# Patient Record
Sex: FEMALE | Race: BLACK OR AFRICAN AMERICAN | Employment: FULL TIME | URBAN - METROPOLITAN AREA
[De-identification: names, ages, dates, MRNs, and addresses within clinical notes are randomized per-mention and may not be internally consistent; named-entity substitution may affect disease eponyms.]

---

## 2018-06-10 PROBLEM — K81.0 ACUTE CHOLECYSTITIS: Status: ACTIVE | Noted: 2018-06-10

## 2018-06-11 PROBLEM — K80.00 ACUTE CALCULOUS CHOLECYSTITIS: Status: ACTIVE | Noted: 2018-06-10

## 2018-06-13 ENCOUNTER — TELEPHONE (OUTPATIENT)
Dept: SURGERY | Age: 33
End: 2018-06-13

## 2018-06-18 ENCOUNTER — TELEPHONE (OUTPATIENT)
Dept: SURGERY | Age: 33
End: 2018-06-18

## 2018-06-19 PROBLEM — S30.1XXA ABDOMINAL HEMATOMA: Status: ACTIVE | Noted: 2018-06-19

## 2018-06-19 PROBLEM — Z90.49 S/P LAPAROSCOPIC CHOLECYSTECTOMY: Status: ACTIVE | Noted: 2018-06-19

## 2018-06-19 PROBLEM — S36.112A: Status: ACTIVE | Noted: 2018-06-19

## 2018-06-22 ENCOUNTER — TELEPHONE (OUTPATIENT)
Dept: SURGERY | Age: 33
End: 2018-06-22

## 2018-06-22 DIAGNOSIS — T14.8XXA HEMATOMA: Primary | ICD-10-CM

## 2018-06-22 RX ORDER — OXYCODONE HYDROCHLORIDE AND ACETAMINOPHEN 5; 325 MG/1; MG/1
1 TABLET ORAL EVERY 6 HOURS PRN
Qty: 12 TABLET | Refills: 0 | Status: SHIPPED | OUTPATIENT
Start: 2018-06-22 | End: 2018-06-25

## 2018-06-28 ENCOUNTER — OFFICE VISIT (OUTPATIENT)
Dept: SURGERY | Age: 33
End: 2018-06-28

## 2018-06-28 VITALS
BODY MASS INDEX: 41.23 KG/M2 | HEART RATE: 97 BPM | SYSTOLIC BLOOD PRESSURE: 136 MMHG | DIASTOLIC BLOOD PRESSURE: 81 MMHG | HEIGHT: 70 IN | WEIGHT: 288 LBS

## 2018-06-28 DIAGNOSIS — Z90.49 S/P LAPAROSCOPIC CHOLECYSTECTOMY: Primary | ICD-10-CM

## 2018-06-28 PROCEDURE — 99024 POSTOP FOLLOW-UP VISIT: CPT | Performed by: SURGERY

## 2018-11-12 ENCOUNTER — APPOINTMENT (OUTPATIENT)
Dept: GENERAL RADIOLOGY | Age: 33
End: 2018-11-12
Payer: COMMERCIAL

## 2018-11-12 ENCOUNTER — HOSPITAL ENCOUNTER (EMERGENCY)
Age: 33
Discharge: HOME | End: 2018-11-12
Payer: COMMERCIAL

## 2018-11-12 VITALS
TEMPERATURE: 98.2 F | OXYGEN SATURATION: 97 % | RESPIRATION RATE: 16 BRPM | SYSTOLIC BLOOD PRESSURE: 142 MMHG | DIASTOLIC BLOOD PRESSURE: 97 MMHG | HEART RATE: 87 BPM

## 2018-11-12 DIAGNOSIS — S93.401A SPRAIN OF RIGHT ANKLE, UNSPECIFIED LIGAMENT, INITIAL ENCOUNTER: Primary | ICD-10-CM

## 2018-11-12 PROCEDURE — 99283 EMERGENCY DEPT VISIT LOW MDM: CPT

## 2018-11-12 PROCEDURE — 73630 X-RAY EXAM OF FOOT: CPT

## 2018-11-12 PROCEDURE — 73610 X-RAY EXAM OF ANKLE: CPT

## 2018-11-12 PROCEDURE — 6370000000 HC RX 637 (ALT 250 FOR IP): Performed by: PHYSICIAN ASSISTANT

## 2018-11-12 RX ORDER — HYDROCODONE BITARTRATE AND ACETAMINOPHEN 5; 325 MG/1; MG/1
1 TABLET ORAL ONCE
Status: COMPLETED | OUTPATIENT
Start: 2018-11-12 | End: 2018-11-12

## 2018-11-12 RX ORDER — HYDROCODONE BITARTRATE AND ACETAMINOPHEN 5; 325 MG/1; MG/1
1 TABLET ORAL EVERY 6 HOURS PRN
Qty: 10 TABLET | Refills: 0 | Status: SHIPPED | OUTPATIENT
Start: 2018-11-12 | End: 2018-11-15

## 2018-11-12 RX ADMIN — HYDROCODONE BITARTRATE AND ACETAMINOPHEN 1 TABLET: 5; 325 TABLET ORAL at 21:16

## 2018-11-12 ASSESSMENT — PAIN DESCRIPTION - ORIENTATION: ORIENTATION: RIGHT

## 2018-11-12 ASSESSMENT — PAIN DESCRIPTION - DESCRIPTORS: DESCRIPTORS: ACHING;SORE;THROBBING

## 2018-11-12 ASSESSMENT — PAIN SCALES - GENERAL
PAINLEVEL_OUTOF10: 7
PAINLEVEL_OUTOF10: 10
PAINLEVEL_OUTOF10: 7

## 2018-11-12 ASSESSMENT — PAIN DESCRIPTION - PAIN TYPE: TYPE: ACUTE PAIN

## 2018-11-12 ASSESSMENT — ENCOUNTER SYMPTOMS
BACK PAIN: 0
NAUSEA: 0

## 2018-11-12 ASSESSMENT — PAIN DESCRIPTION - LOCATION: LOCATION: ANKLE

## 2018-11-12 ASSESSMENT — PAIN - FUNCTIONAL ASSESSMENT: PAIN_FUNCTIONAL_ASSESSMENT: 0-10

## 2018-11-13 NOTE — ED PROVIDER NOTES
SECTION      x 3    CHOLECYSTECTOMY, LAPAROSCOPIC  2018     Laparoscopic cholecystectomy with cholangiogram    WISDOM TOOTH EXTRACTION         CURRENT MEDICATIONS       Previous Medications    ALBUTEROL SULFATE  (90 BASE) MCG/ACT INHALER    Inhale 2 puffs into the lungs every 6 hours as needed for Wheezing    DIPHENHYDRAMINE (BENADRYL) 25 MG CAPSULE    Take 1-2 capsules by mouth every 6 hours as needed for Itching    IBUPROFEN (ADVIL;MOTRIN) 800 MG TABLET    Take 1 tablet by mouth every 8 hours as needed for Pain    METOCLOPRAMIDE (REGLAN) 10 MG TABLET    Take 1 tablet by mouth 4 times daily WARNING:  May cause drowsiness. May impair ability to operate vehicles or machinery. Do not use in combination with alcohol. OMEPRAZOLE (PRILOSEC) 20 MG DELAYED RELEASE CAPSULE    Take 40 mg by mouth daily       ALLERGIES     Shellfish-derived products    FAMILY HISTORY       Family History   Problem Relation Age of Onset    High Blood Pressure Father     Diabetes Paternal Grandmother     High Blood Pressure Paternal Grandmother     Diabetes Paternal Grandfather     High Blood Pressure Paternal Grandfather     Asthma Mother     Cancer Maternal Grandmother      Family Status   Relation Status    Father Alive    PGM Alive    [facility] Alive    Mother Alive    [place]         SOCIAL HISTORY    reports that she has never smoked. She has never used smokeless tobacco. She reports that she does not drink alcohol or use drugs. PHYSICAL EXAM    (up to 7 for level 4, 8 or more for level 5)     ED Triage Vitals [18]   BP Temp Temp Source Pulse Resp SpO2 Height Weight   (!) 142/97 98.2 °F (36.8 °C) Oral 87 16 97 % -- --       Physical Exam   Constitutional: She is oriented to person, place, and time. She appears well-developed and well-nourished. No distress. HENT:   Head: Normocephalic and atraumatic. Neck: Neck supple. Cardiovascular: Intact distal pulses.     Pulmonary/Chest: Effort

## 2018-12-13 ENCOUNTER — HOSPITAL ENCOUNTER (EMERGENCY)
Age: 33
Discharge: HOME | End: 2018-12-13
Attending: EMERGENCY MEDICINE
Payer: COMMERCIAL

## 2018-12-13 VITALS
BODY MASS INDEX: 46.43 KG/M2 | RESPIRATION RATE: 16 BRPM | WEIGHT: 293 LBS | DIASTOLIC BLOOD PRESSURE: 86 MMHG | SYSTOLIC BLOOD PRESSURE: 148 MMHG | OXYGEN SATURATION: 100 % | HEART RATE: 76 BPM | TEMPERATURE: 98.7 F

## 2018-12-13 DIAGNOSIS — T78.40XA ALLERGIC REACTION, INITIAL ENCOUNTER: Primary | ICD-10-CM

## 2018-12-13 PROCEDURE — 99283 EMERGENCY DEPT VISIT LOW MDM: CPT

## 2018-12-13 PROCEDURE — 6370000000 HC RX 637 (ALT 250 FOR IP): Performed by: EMERGENCY MEDICINE

## 2018-12-13 RX ORDER — DIPHENHYDRAMINE HCL 25 MG
25-50 CAPSULE ORAL EVERY 6 HOURS PRN
Qty: 20 CAPSULE | Refills: 0 | Status: ON HOLD | OUTPATIENT
Start: 2018-12-13 | End: 2019-12-29

## 2018-12-13 RX ORDER — PREDNISONE 20 MG/1
60 TABLET ORAL ONCE
Status: COMPLETED | OUTPATIENT
Start: 2018-12-13 | End: 2018-12-13

## 2018-12-13 RX ORDER — PREDNISONE 20 MG/1
40 TABLET ORAL DAILY
Qty: 10 TABLET | Refills: 0 | Status: SHIPPED | OUTPATIENT
Start: 2018-12-13 | End: 2018-12-18

## 2018-12-13 RX ADMIN — PREDNISONE 60 MG: 20 TABLET ORAL at 00:42

## 2018-12-13 NOTE — ED PROVIDER NOTES
reviewed radiologic plain film image(s). ALL OTHER NON-PLAIN FILM IMAGES SUCH AS CT, ULTRASOUND AND MRI HAVE BEEN READ BY THE RADIOLOGIST. No orders to display              PROCEDURES    ED COURSE/MDM  Patient seen and evaluated. Patient was given Oral prednisone while in the ED. Patient requested not to have Benadryl she would like to drive home this evening. States she has Benadryl at home that she'll take. I discussed results and plan of care with patient and family. 1:42 AM  Patient reassessed. Patient has no respiratory distress. No wheezing on exam.  Patient requesting discharge. We'll prescribe Benadryl and steroid burst at discharge. Return instructions provided     I do feel patient can be safely discharged to home. Recommend follow up with PCP in 2-3 days for re-evaluation. Reasons to RT ED discussed. Patient expresses understanding and is in agreement with plan. Patient was given scripts for the following medications. I counseled patient how to take these medications. New Prescriptions    No medications on file           CLINICAL IMPRESSION  1. Allergic reaction, initial encounter        Blood pressure (!) 151/83, pulse 81, temperature 99 °F (37.2 °C), temperature source Oral, resp. rate 18, weight (!) 314 lb 6 oz (142.6 kg), last menstrual period 11/12/2018, SpO2 100 %, unknown if currently breastfeeding. DISPOSITION  Patient was discharged to home in good condition. Disclaimer: All medical record entries made by [address] [place] [address] [address] [facility].       (Please note that this note was completed with a voice recognition program. Every attempt was made to edit the dictations, but inevitably there remain words that are mis-transcribed.)           Melissa Garcia MD  12/13/18 0144

## 2019-04-03 VITALS
HEIGHT: 70 IN | OXYGEN SATURATION: 100 % | BODY MASS INDEX: 41.95 KG/M2 | HEART RATE: 78 BPM | SYSTOLIC BLOOD PRESSURE: 159 MMHG | RESPIRATION RATE: 18 BRPM | DIASTOLIC BLOOD PRESSURE: 99 MMHG | TEMPERATURE: 98.4 F | WEIGHT: 293 LBS

## 2019-04-03 PROCEDURE — 93005 ELECTROCARDIOGRAM TRACING: CPT | Performed by: EMERGENCY MEDICINE

## 2019-04-03 ASSESSMENT — PAIN DESCRIPTION - ORIENTATION: ORIENTATION: RIGHT

## 2019-04-03 ASSESSMENT — PAIN DESCRIPTION - DESCRIPTORS
DESCRIPTORS: STABBING
DESCRIPTORS_2: SHARP

## 2019-04-03 ASSESSMENT — PAIN DESCRIPTION - PROGRESSION
CLINICAL_PROGRESSION_2: NOT CHANGED
CLINICAL_PROGRESSION: GRADUALLY WORSENING

## 2019-04-03 ASSESSMENT — PAIN DESCRIPTION - FREQUENCY: FREQUENCY: CONTINUOUS

## 2019-04-03 ASSESSMENT — PAIN SCALES - GENERAL: PAINLEVEL_OUTOF10: 8

## 2019-04-03 ASSESSMENT — PAIN DESCRIPTION - PAIN TYPE
TYPE: ACUTE PAIN
TYPE_2: ACUTE PAIN

## 2019-04-03 ASSESSMENT — PAIN DESCRIPTION - INTENSITY: RATING_2: 4

## 2019-04-03 ASSESSMENT — PAIN DESCRIPTION - LOCATION
LOCATION: ABDOMEN
LOCATION_2: CHEST

## 2019-04-03 ASSESSMENT — PAIN DESCRIPTION - ONSET
ONSET_2: ON-GOING
ONSET: ON-GOING

## 2019-04-03 ASSESSMENT — PAIN DESCRIPTION - DURATION: DURATION_2: INTERMITTENT

## 2019-04-04 ENCOUNTER — APPOINTMENT (OUTPATIENT)
Dept: CT IMAGING | Age: 34
End: 2019-04-04
Payer: COMMERCIAL

## 2019-04-04 ENCOUNTER — HOSPITAL ENCOUNTER (EMERGENCY)
Age: 34
Discharge: HOME | End: 2019-04-04
Attending: EMERGENCY MEDICINE
Payer: COMMERCIAL

## 2019-04-04 DIAGNOSIS — R10.31 ABDOMINAL PAIN, RIGHT LOWER QUADRANT: Primary | ICD-10-CM

## 2019-04-04 LAB
A/G RATIO: 1.4 (ref 1.1–2.2)
ALBUMIN SERPL-MCNC: 4.2 G/DL (ref 3.4–5)
ALP BLD-CCNC: 99 U/L (ref 40–129)
ALT SERPL-CCNC: 15 U/L (ref 10–40)
ANION GAP SERPL CALCULATED.3IONS-SCNC: 12 MMOL/L (ref 3–16)
AST SERPL-CCNC: 12 U/L (ref 15–37)
BACTERIA: ABNORMAL /HPF
BASOPHILS ABSOLUTE: 0 K/UL (ref 0–0.2)
BASOPHILS RELATIVE PERCENT: 0.8 %
BILIRUB SERPL-MCNC: <0.2 MG/DL (ref 0–1)
BILIRUBIN URINE: NEGATIVE
BLOOD, URINE: NEGATIVE
BUN BLDV-MCNC: 6 MG/DL (ref 7–20)
CALCIUM SERPL-MCNC: 9.1 MG/DL (ref 8.3–10.6)
CHLORIDE BLD-SCNC: 102 MMOL/L (ref 99–110)
CLARITY: ABNORMAL
CO2: 22 MMOL/L (ref 21–32)
COLOR: YELLOW
CREAT SERPL-MCNC: 0.7 MG/DL (ref 0.6–1.1)
EKG ATRIAL RATE: 70 BPM
EKG DIAGNOSIS: NORMAL
EKG P AXIS: 46 DEGREES
EKG P-R INTERVAL: 174 MS
EKG Q-T INTERVAL: 386 MS
EKG QRS DURATION: 90 MS
EKG QTC CALCULATION (BAZETT): 416 MS
EKG R AXIS: 53 DEGREES
EKG T AXIS: 26 DEGREES
EKG VENTRICULAR RATE: 70 BPM
EOSINOPHILS ABSOLUTE: 0.1 K/UL (ref 0–0.6)
EOSINOPHILS RELATIVE PERCENT: 2 %
EPITHELIAL CELLS, UA: 6 /HPF (ref 0–5)
GFR AFRICAN AMERICAN: >60
GFR NON-AFRICAN AMERICAN: >60
GLOBULIN: 3.1 G/DL
GLUCOSE BLD-MCNC: 105 MG/DL (ref 70–99)
GLUCOSE URINE: NEGATIVE MG/DL
HCG(URINE) PREGNANCY TEST: NEGATIVE
HCT VFR BLD CALC: 41.3 % (ref 36–48)
HEMOGLOBIN: 13.8 G/DL (ref 12–16)
HYALINE CASTS: 0 /LPF (ref 0–8)
KETONES, URINE: NEGATIVE MG/DL
LACTIC ACID: 1.4 MMOL/L (ref 0.4–2)
LEUKOCYTE ESTERASE, URINE: NEGATIVE
LYMPHOCYTES ABSOLUTE: 2.1 K/UL (ref 1–5.1)
LYMPHOCYTES RELATIVE PERCENT: 33.7 %
MCH RBC QN AUTO: 29.6 PG (ref 26–34)
MCHC RBC AUTO-ENTMCNC: 33.4 G/DL (ref 31–36)
MCV RBC AUTO: 88.7 FL (ref 80–100)
MICROSCOPIC EXAMINATION: YES
MONOCYTES ABSOLUTE: 0.3 K/UL (ref 0–1.3)
MONOCYTES RELATIVE PERCENT: 5.6 %
NEUTROPHILS ABSOLUTE: 3.5 K/UL (ref 1.7–7.7)
NEUTROPHILS RELATIVE PERCENT: 57.9 %
NITRITE, URINE: NEGATIVE
PDW BLD-RTO: 13.3 % (ref 12.4–15.4)
PH UA: 6 (ref 5–8)
PLATELET # BLD: 343 K/UL (ref 135–450)
PMV BLD AUTO: 9.1 FL (ref 5–10.5)
POTASSIUM SERPL-SCNC: 3.6 MMOL/L (ref 3.5–5.1)
PROTEIN UA: NEGATIVE MG/DL
RBC # BLD: 4.66 M/UL (ref 4–5.2)
RBC UA: 2 /HPF (ref 0–4)
SODIUM BLD-SCNC: 136 MMOL/L (ref 136–145)
SPECIFIC GRAVITY UA: 1.02 (ref 1–1.03)
TOTAL PROTEIN: 7.3 G/DL (ref 6.4–8.2)
URINE REFLEX TO CULTURE: ABNORMAL
URINE TYPE: ABNORMAL
UROBILINOGEN, URINE: 0.2 E.U./DL
WBC # BLD: 6.1 K/UL (ref 4–11)
WBC UA: 5 /HPF (ref 0–5)

## 2019-04-04 PROCEDURE — 96375 TX/PRO/DX INJ NEW DRUG ADDON: CPT

## 2019-04-04 PROCEDURE — 83605 ASSAY OF LACTIC ACID: CPT

## 2019-04-04 PROCEDURE — 80053 COMPREHEN METABOLIC PANEL: CPT

## 2019-04-04 PROCEDURE — 93010 ELECTROCARDIOGRAM REPORT: CPT | Performed by: INTERNAL MEDICINE

## 2019-04-04 PROCEDURE — 6360000002 HC RX W HCPCS: Performed by: EMERGENCY MEDICINE

## 2019-04-04 PROCEDURE — 96365 THER/PROPH/DIAG IV INF INIT: CPT

## 2019-04-04 PROCEDURE — 85025 COMPLETE CBC W/AUTO DIFF WBC: CPT

## 2019-04-04 PROCEDURE — 81001 URINALYSIS AUTO W/SCOPE: CPT

## 2019-04-04 PROCEDURE — 6360000004 HC RX CONTRAST MEDICATION: Performed by: EMERGENCY MEDICINE

## 2019-04-04 PROCEDURE — 2500000003 HC RX 250 WO HCPCS: Performed by: EMERGENCY MEDICINE

## 2019-04-04 PROCEDURE — 84703 CHORIONIC GONADOTROPIN ASSAY: CPT

## 2019-04-04 PROCEDURE — 99285 EMERGENCY DEPT VISIT HI MDM: CPT

## 2019-04-04 PROCEDURE — 74177 CT ABD & PELVIS W/CONTRAST: CPT

## 2019-04-04 PROCEDURE — 2580000003 HC RX 258: Performed by: EMERGENCY MEDICINE

## 2019-04-04 PROCEDURE — 96367 TX/PROPH/DG ADDL SEQ IV INF: CPT

## 2019-04-04 RX ORDER — DICYCLOMINE HYDROCHLORIDE 10 MG/1
10 CAPSULE ORAL EVERY 6 HOURS PRN
Qty: 20 CAPSULE | Refills: 0 | Status: SHIPPED | OUTPATIENT
Start: 2019-04-04 | End: 2019-09-26

## 2019-04-04 RX ORDER — ONDANSETRON 2 MG/ML
4 INJECTION INTRAMUSCULAR; INTRAVENOUS ONCE
Status: COMPLETED | OUTPATIENT
Start: 2019-04-04 | End: 2019-04-04

## 2019-04-04 RX ORDER — DIPHENHYDRAMINE HYDROCHLORIDE 50 MG/ML
25 INJECTION INTRAMUSCULAR; INTRAVENOUS ONCE
Status: COMPLETED | OUTPATIENT
Start: 2019-04-04 | End: 2019-04-04

## 2019-04-04 RX ORDER — MORPHINE SULFATE 4 MG/ML
4 INJECTION, SOLUTION INTRAMUSCULAR; INTRAVENOUS ONCE
Status: COMPLETED | OUTPATIENT
Start: 2019-04-04 | End: 2019-04-04

## 2019-04-04 RX ORDER — ONDANSETRON 4 MG/1
4 TABLET, ORALLY DISINTEGRATING ORAL EVERY 8 HOURS PRN
Qty: 20 TABLET | Refills: 0 | Status: SHIPPED | OUTPATIENT
Start: 2019-04-04 | End: 2019-09-26

## 2019-04-04 RX ADMIN — DIPHENHYDRAMINE HYDROCHLORIDE 25 MG: 50 INJECTION, SOLUTION INTRAMUSCULAR; INTRAVENOUS at 02:23

## 2019-04-04 RX ADMIN — CEFTRIAXONE 1 G: 1 INJECTION, POWDER, FOR SOLUTION INTRAMUSCULAR; INTRAVENOUS at 02:27

## 2019-04-04 RX ADMIN — MORPHINE SULFATE 4 MG: 4 INJECTION INTRAVENOUS at 02:21

## 2019-04-04 RX ADMIN — ONDANSETRON 4 MG: 2 INJECTION INTRAMUSCULAR; INTRAVENOUS at 02:19

## 2019-04-04 RX ADMIN — METRONIDAZOLE 500 MG: 500 INJECTION, SOLUTION INTRAVENOUS at 03:11

## 2019-04-04 RX ADMIN — IOPAMIDOL 75 ML: 755 INJECTION, SOLUTION INTRAVENOUS at 02:55

## 2019-04-04 ASSESSMENT — PAIN SCALES - GENERAL
PAINLEVEL_OUTOF10: 7
PAINLEVEL_OUTOF10: 0

## 2019-04-04 NOTE — ED NOTES
States lower abdominal pain for 1 week. States it got worse tonight. Had a low grade fever a few days ago. C/o some dysuria, but saw OB/GYN recently who said she did not have a UTI.      Sosa Murguia RN  04/04/19 9489

## 2019-04-04 NOTE — LETTER
[facility] Emergency Department  [address] [facility] [address]  Phone: [phone]               April 4, 2019    Patient: Jim Garcia   YOB: 1985   Date of Visit: 4/3/2019       To Whom It May Concern:    Jim Garcia was seen and treated in our emergency department on 4/3/2019. She may be off work 4/15563.       Sincerely,       Heri Olivas RN         Signature:__________________________________

## 2019-04-04 NOTE — ED PROVIDER NOTES
[facility]  eMERGENCYdEPARTMENT eNCOUnter      Pt Name: Rafat Oliveira  MRN: [id number]  [place] 1985  Date of evaluation: 4/3/2019  Provider:Jadon Ruvalcaba MD    [address] [place] [address]       Chief Complaint   Patient presents with    Abdominal Pain     x 1 week worse today, lower right, tender to touch, states fever yesterday     Nausea    Chest Pain     stated this AM, points to center of chest, states she has been vomiting \"a lot\"         HISTORY OF PRESENT ILLNESS    Rafat Oliveira is a 27 y.o. female who presents to the emergency department with abdominal pain. Onset last week, started epigastric and then migrated to RLQ. Gradually worsening. Not better with anything, not worse with anything. Sharp in quality. 6/10 severity right now. Nursing Notes were reviewed. REVIEW OF SYSTEMS       Review of Systems    10 point review of systems was performed and was negative exceptas specifically noted in the HPI.       PAST MEDICAL HISTORY     Past Medical History:   Diagnosis Date    Asthma     dx'd at 23 yo, not well-controlled    Hypertension     with this pregnancy    Obesity     Pre-eclampsia     Pulmonary emboli (HCC)          SURGICAL HISTORY       Past Surgical History:   Procedure Laterality Date     SECTION      x 3    CHOLECYSTECTOMY, LAPAROSCOPIC  2018     Laparoscopic cholecystectomy with cholangiogram    WISDOM TOOTH EXTRACTION           CURRENT MEDICATIONS       Previous Medications    ALBUTEROL SULFATE  (90 BASE) MCG/ACT INHALER    Inhale 2 puffs into the lungs every 6 hours as needed for Wheezing    DIPHENHYDRAMINE (BENADRYL) 25 MG CAPSULE    Take 1-2 capsules by mouth every 6 hours as needed for Itching    IBUPROFEN (ADVIL;MOTRIN) 800 MG TABLET    Take 1 tablet by mouth every 8 hours as needed for Pain    OMEPRAZOLE (PRILOSEC) 20 MG DELAYED RELEASE CAPSULE    Take 40 mg by mouth daily       ALLERGIES     Shellfish-derived products    FAMILY HISTORY       Family History   Problem Relation Age of Onset    High Blood Pressure Father     Diabetes Paternal Grandmother     High Blood Pressure Paternal Grandmother     Diabetes Paternal Grandfather     High Blood Pressure Paternal Grandfather     Asthma Mother     Cancer Maternal Grandmother           SOCIAL HISTORY       Social History     Socioeconomic History    Marital status:      Spouse name: Not on file    Number of children: Not on file    Years of education: Not on file    Highest education level: Not on file   Occupational History    Not on file   Social Needs    Financial resource strain: Not on file    Food insecurity:     Worry: Not on file     Inability: Not on file    Transportation needs:     Medical: Not on file     Non-medical: Not on file   Tobacco Use    Smoking status: Never Smoker    Smokeless tobacco: Never Used   Substance and Sexual Activity    Alcohol use: No     Comment: rarely    Drug use: No    Sexual activity: Yes     Partners: Male   Lifestyle    Physical activity:     Days per week: Not on file     Minutes per session: Not on file    Stress: Not on file   Relationships    Social connections:     Talks on phone: Not on file     Gets together: Not on file     Attends [employer] service: Not on file     Active member of club or organization: Not on file     Attends meetings of clubs or organizations: Not on file     Relationship status: Not on file    Intimate partner violence:     Fear of current or ex partner: Not on file     Emotionally abused: Not on file     Physically abused: Not on file     Forced sexual activity: Not on file   Other Topics Concern    Not on file   Social History Narrative    Not on file       SCREENINGS   @[id number])@         PHYSICAL EXAM       ED Triage Vitals [04/03/19 2256]   BP Temp Temp Source Pulse Resp SpO2 Height Weight   (!) 159/99 98.4 °F (36.9 °C) Oral 78 18 100 % 5' 10\" (1.778 m) (!) 306 lb 3.5 oz (138.9 kg)       Physical Exam  General appearance: Alert, cooperative, no distress, appears stated age. Head:  Normocephalic, without obvious abnormality, atraumatic. HEENT: Mucous membranes moist.  Neck: Full ROM, trachea midline, no JVD  Lungs: No respiratory distress  Cardiovasular: Perfusing extremities  Abdomen: RLQ TTP, +L sided rebound, no guarding. No palpable masses. No CVA TTP. Extremities: Atraumatic, full ROM  Skin: No rashes or lesions to exposed skin  Neurologic: Alert and oriented x3, motor grossly normal, clear speech    DIAGNOSTIC RESULTS     EKG:     RADIOLOGY:   Non-plain film images such as CT, Ultrasound and MRI are read by the radiologist.Plain radiographic images are visualized and preliminarily interpreted by the emergency physician with the below findings:        Interpretation per the Radiologist below, if available at the time of this note:    CT ABDOMEN PELVIS W IV CONTRAST Additional Contrast? None   Final Result   1. The appendix is not inflamed. 2. No CT evidence of an acute inflammatory process in the abdomen and pelvis. 3. Interval resolution of previously described subhepatic hematoma on the   prior study from 06/19/2018. 4. Mild biliary prominence likely reflecting a post cholecystectomy state. Please correlate with liver function testing.                EDBEDSIDE ULTRASOUND:   Performed by Kain Cabral - none    LABS:  Labs Reviewed   URINE RT REFLEX TO CULTURE - Abnormal; Notable for the following components:       Result Value    Clarity, UA TURBID (*)     All other components within normal limits    Narrative:     Performed at:  [address] [address]   Phone [phone]   MICROSCOPIC URINALYSIS - Abnormal; Notable for the following components:    Bacteria, UA RARE (*)     Epi Cells 6 (*)     All other components within normal limits    Narrative:     Performed at:  [facility]  [address] [facility], [place] [address]   Phone [phone]   COMPREHENSIVE METABOLIC PANEL - Abnormal; Notable for the following components:    Glucose 105 (*)     BUN 6 (*)     AST 12 (*)     All other components within normal limits    Narrative:     Performed at:  [facility]  [address] [place], [place] [address]   Phone [phone]   PREGNANCY, URINE    Narrative:     Performed at:  [facility]  [address] [place], [place] [address]   Phone [phone]   CBC WITH AUTO DIFFERENTIAL    Narrative:     Performed at:  [facility]  [address] [place], [facility] [place] [address]   Phone [phone]   LACTIC ACID, PLASMA    Narrative:     Performed at:  [facility]  [address] [place], [facility] [place] [address]   Phone [phone]       All other labs were within normal range or not returned as of this dictation. EMERGENCY DEPARTMENT COURSE and DIFFERENTIAL DIAGNOSIS/MDM:   Vitals:    Vitals:    04/03/19 2256   BP: (!) 159/99   Pulse: 78   Resp: 18   Temp: 98.4 °F (36.9 °C)   TempSrc: Oral   SpO2: 100%   Weight: (!) 306 lb 3.5 oz (138.9 kg)   Height: 5' 10\" (1.778 m)       MDM  Pt presents with abdominal pain. At presentation VSS. On exam RLQ TTP, L sided rebound. No guarding. With epigastric ->RLQ migration of pain significant concern for appendicitis, also very concerning exam. Labs show no leukocytosis, no UTI. CT abdomen pelvis shows no acute inflammatory process. Unclear cause but do not suspect surgical emergency. Will dc to home with zofran, bentyl. REASSESSMENT          CRITICAL CARE TIME   Total Critical Care time was 0 minutes, excluding separately reportable procedures. There was a high probability of clinically significant/life threatening deteriorationin the patient's condition which required my urgent intervention. CONSULTS:  None     PROCEDURES:  Unless otherwise noted below, none     Procedures    FINAL IMPRESSION      1.  Abdominal pain, right lower quadrant          DISPOSITION/PLAN   DISPOSITION Decision To Discharge 04/04/2019 03:29:39 AM      PATIENT REFERRED TO:  Diego Shay MD  [address] [place] [address]  [phone]    Schedule an appointment as soon as possible for a visit         DISCHARGE MEDICATIONS:  New Prescriptions    DICYCLOMINE (BENTYL) 10 MG CAPSULE    Take 1 capsule by mouth every 6 hours as needed (cramps)    ONDANSETRON (ZOFRAN ODT) 4 MG DISINTEGRATING TABLET    Take 1 tablet by mouth every 8 hours as needed for Nausea          (Please note that portions of this note were completed with a voicerecognition program.  Efforts were made to edit the dictations but occasionally words are mis-transcribed.)    Supriya Romero MD (electronically signed)  Attending Emergency Physician            Supriya Romero MD  04/04/19 1352

## 2019-04-04 NOTE — ED NOTES
Waiting for ride. Discharge paperwork given. All questions answered.      Jerry Berrios RN  04/04/19 9113

## 2019-09-26 ENCOUNTER — APPOINTMENT (OUTPATIENT)
Dept: GENERAL RADIOLOGY | Age: 34
End: 2019-09-26
Payer: COMMERCIAL

## 2019-09-26 ENCOUNTER — HOSPITAL ENCOUNTER (EMERGENCY)
Age: 34
Discharge: HOME | End: 2019-09-26
Payer: COMMERCIAL

## 2019-09-26 ENCOUNTER — APPOINTMENT (OUTPATIENT)
Dept: CT IMAGING | Age: 34
End: 2019-09-26
Payer: COMMERCIAL

## 2019-09-26 VITALS
HEIGHT: 70 IN | HEART RATE: 84 BPM | SYSTOLIC BLOOD PRESSURE: 133 MMHG | TEMPERATURE: 98.5 F | WEIGHT: 293 LBS | RESPIRATION RATE: 20 BRPM | OXYGEN SATURATION: 100 % | DIASTOLIC BLOOD PRESSURE: 67 MMHG | BODY MASS INDEX: 41.95 KG/M2

## 2019-09-26 DIAGNOSIS — I95.1 ORTHOSTATIC SYNCOPE: Primary | ICD-10-CM

## 2019-09-26 LAB
ANION GAP SERPL CALCULATED.3IONS-SCNC: 16 MMOL/L (ref 3–16)
BACTERIA: ABNORMAL /HPF
BASOPHILS ABSOLUTE: 0.1 K/UL (ref 0–0.2)
BASOPHILS RELATIVE PERCENT: 1.3 %
BILIRUBIN URINE: NEGATIVE
BLOOD, URINE: ABNORMAL
BUN BLDV-MCNC: 9 MG/DL (ref 7–20)
CALCIUM SERPL-MCNC: 9.8 MG/DL (ref 8.3–10.6)
CHLORIDE BLD-SCNC: 106 MMOL/L (ref 99–110)
CLARITY: CLEAR
CO2: 22 MMOL/L (ref 21–32)
COLOR: YELLOW
CREAT SERPL-MCNC: 0.8 MG/DL (ref 0.6–1.1)
D DIMER: 228 NG/ML DDU (ref 0–229)
EOSINOPHILS ABSOLUTE: 0.3 K/UL (ref 0–0.6)
EOSINOPHILS RELATIVE PERCENT: 4.6 %
EPITHELIAL CELLS, UA: 3 /HPF (ref 0–5)
GFR AFRICAN AMERICAN: >60
GFR NON-AFRICAN AMERICAN: >60
GLUCOSE BLD-MCNC: 96 MG/DL (ref 70–99)
GLUCOSE URINE: NEGATIVE MG/DL
HCG(URINE) PREGNANCY TEST: NEGATIVE
HCT VFR BLD CALC: 43.2 % (ref 36–48)
HEMOGLOBIN: 14.5 G/DL (ref 12–16)
HYALINE CASTS: 1 /LPF (ref 0–8)
KETONES, URINE: NEGATIVE MG/DL
LEUKOCYTE ESTERASE, URINE: NEGATIVE
LYMPHOCYTES ABSOLUTE: 1.9 K/UL (ref 1–5.1)
LYMPHOCYTES RELATIVE PERCENT: 29.5 %
MCH RBC QN AUTO: 30.1 PG (ref 26–34)
MCHC RBC AUTO-ENTMCNC: 33.6 G/DL (ref 31–36)
MCV RBC AUTO: 89.7 FL (ref 80–100)
MICROSCOPIC EXAMINATION: YES
MONOCYTES ABSOLUTE: 0.6 K/UL (ref 0–1.3)
MONOCYTES RELATIVE PERCENT: 9.6 %
NEUTROPHILS ABSOLUTE: 3.6 K/UL (ref 1.7–7.7)
NEUTROPHILS RELATIVE PERCENT: 55 %
NITRITE, URINE: NEGATIVE
PDW BLD-RTO: 13.3 % (ref 12.4–15.4)
PH UA: 6.5 (ref 5–8)
PLATELET # BLD: 330 K/UL (ref 135–450)
PLATELET SLIDE REVIEW: ADEQUATE
PMV BLD AUTO: 9.6 FL (ref 5–10.5)
POTASSIUM REFLEX MAGNESIUM: 4.1 MMOL/L (ref 3.5–5.1)
PROTEIN UA: NEGATIVE MG/DL
RBC # BLD: 4.81 M/UL (ref 4–5.2)
RBC UA: 3 /HPF (ref 0–4)
SLIDE REVIEW: NORMAL
SODIUM BLD-SCNC: 144 MMOL/L (ref 136–145)
SPECIFIC GRAVITY UA: 1.02 (ref 1–1.03)
URINE REFLEX TO CULTURE: ABNORMAL
URINE TYPE: ABNORMAL
UROBILINOGEN, URINE: 0.2 E.U./DL
WBC # BLD: 6.5 K/UL (ref 4–11)
WBC UA: 3 /HPF (ref 0–5)

## 2019-09-26 PROCEDURE — 2580000003 HC RX 258: Performed by: PHYSICIAN ASSISTANT

## 2019-09-26 PROCEDURE — 71045 X-RAY EXAM CHEST 1 VIEW: CPT

## 2019-09-26 PROCEDURE — 85379 FIBRIN DEGRADATION QUANT: CPT

## 2019-09-26 PROCEDURE — 93005 ELECTROCARDIOGRAM TRACING: CPT | Performed by: PHYSICIAN ASSISTANT

## 2019-09-26 PROCEDURE — 84703 CHORIONIC GONADOTROPIN ASSAY: CPT

## 2019-09-26 PROCEDURE — 6360000002 HC RX W HCPCS: Performed by: PHYSICIAN ASSISTANT

## 2019-09-26 PROCEDURE — 96361 HYDRATE IV INFUSION ADD-ON: CPT

## 2019-09-26 PROCEDURE — 99284 EMERGENCY DEPT VISIT MOD MDM: CPT

## 2019-09-26 PROCEDURE — 80048 BASIC METABOLIC PNL TOTAL CA: CPT

## 2019-09-26 PROCEDURE — 81001 URINALYSIS AUTO W/SCOPE: CPT

## 2019-09-26 PROCEDURE — 96374 THER/PROPH/DIAG INJ IV PUSH: CPT

## 2019-09-26 PROCEDURE — 70450 CT HEAD/BRAIN W/O DYE: CPT

## 2019-09-26 PROCEDURE — 85025 COMPLETE CBC W/AUTO DIFF WBC: CPT

## 2019-09-26 RX ORDER — 0.9 % SODIUM CHLORIDE 0.9 %
1000 INTRAVENOUS SOLUTION INTRAVENOUS ONCE
Status: COMPLETED | OUTPATIENT
Start: 2019-09-26 | End: 2019-09-26

## 2019-09-26 RX ORDER — ONDANSETRON 2 MG/ML
4 INJECTION INTRAMUSCULAR; INTRAVENOUS ONCE
Status: COMPLETED | OUTPATIENT
Start: 2019-09-26 | End: 2019-09-26

## 2019-09-26 RX ADMIN — ONDANSETRON 4 MG: 2 INJECTION INTRAMUSCULAR; INTRAVENOUS at 18:25

## 2019-09-26 RX ADMIN — SODIUM CHLORIDE 1000 ML: 9 INJECTION, SOLUTION INTRAVENOUS at 18:31

## 2019-09-26 ASSESSMENT — PAIN SCALES - GENERAL: PAINLEVEL_OUTOF10: 0

## 2019-09-26 NOTE — ED PROVIDER NOTES
congestion, ear pain, facial swelling, rhinorrhea, sinus pressure, sneezing, sore throat and trouble swallowing. Eyes:  Negative for photophobia, pain and visual disturbance. Respiratory: Positive for occasional asthma related shortness of breath. Negative for cough, wheezing and stridor. Cardiovascular:  Negative for chest pain, palpitations and leg swelling. Gastrointestinal:  Negative for nausea, vomiting, abdominal pain, diarrhea, constipation and blood in stool. Genitourinary:  Negative for dysuria, urgency, hematuria, flank pain, vaginal bleeding, vaginal discharge and pelvic pain. Musculoskeletal:  Negative for myalgias, arthralgias, neck pain and neck stiffness. Neurological: Positive for syncope, lightheadedness. Negative for dizziness, seizures, speech difficulty, weakness, numbness and headaches. Psychiatric/Behavioral:  Negative for suicidal ideas, hallucinations, confusion, sleep disturbance and agitation. Except as noted above the remainder of the review of systems was reviewed and negative.        PAST MEDICAL HISTORY         Diagnosis Date    Asthma     dx'd at 23 yo, not well-controlled    Hypertension     with this pregnancy    Obesity     Pre-eclampsia     Pulmonary emboli ([facility] [address]        SURGICAL HISTORY           Procedure Laterality Date     SECTION      x 3    CHOLECYSTECTOMY, LAPAROSCOPIC  2018     Laparoscopic cholecystectomy with cholangiogram    WISDOM TOOTH EXTRACTION         CURRENT MEDICATIONS       Discharge Medication List as of 2019  9:03 PM      CONTINUE these medications which have NOT CHANGED    Details   ibuprofen (ADVIL;MOTRIN) 800 MG tablet Take 1 tablet by mouth every 8 hours as needed for Pain, Disp-25 tablet, R-1Print      omeprazole (PRILOSEC) 20 MG delayed release capsule Take 40 mg by mouth dailyHistorical Med      albuterol sulfate  (90 BASE) MCG/ACT inhaler Inhale 2 puffs into the lungs every 6 hours as needed for

## 2019-09-26 NOTE — ED NOTES
Pt ambulating to bathroom at this time by self with steady gait.      Katie Dutton RN  09/26/19 0624

## 2019-09-27 LAB
EKG ATRIAL RATE: 73 BPM
EKG DIAGNOSIS: NORMAL
EKG P AXIS: 56 DEGREES
EKG P-R INTERVAL: 166 MS
EKG Q-T INTERVAL: 382 MS
EKG QRS DURATION: 90 MS
EKG QTC CALCULATION (BAZETT): 420 MS
EKG R AXIS: 75 DEGREES
EKG T AXIS: 65 DEGREES
EKG VENTRICULAR RATE: 73 BPM

## 2019-09-27 PROCEDURE — 93010 ELECTROCARDIOGRAM REPORT: CPT | Performed by: INTERNAL MEDICINE

## 2019-11-16 ENCOUNTER — HOSPITAL ENCOUNTER (EMERGENCY)
Age: 34
Discharge: HOME | End: 2019-11-16
Payer: COMMERCIAL

## 2019-11-16 ENCOUNTER — APPOINTMENT (OUTPATIENT)
Dept: GENERAL RADIOLOGY | Age: 34
End: 2019-11-16
Payer: COMMERCIAL

## 2019-11-16 VITALS
WEIGHT: 293 LBS | TEMPERATURE: 98.4 F | OXYGEN SATURATION: 97 % | SYSTOLIC BLOOD PRESSURE: 138 MMHG | HEIGHT: 70 IN | RESPIRATION RATE: 18 BRPM | DIASTOLIC BLOOD PRESSURE: 74 MMHG | BODY MASS INDEX: 41.95 KG/M2 | HEART RATE: 105 BPM

## 2019-11-16 DIAGNOSIS — J45.21 MILD INTERMITTENT ASTHMA WITH EXACERBATION: Primary | ICD-10-CM

## 2019-11-16 LAB
ANION GAP SERPL CALCULATED.3IONS-SCNC: 12 MMOL/L (ref 3–16)
BASOPHILS ABSOLUTE: 0.1 K/UL (ref 0–0.2)
BASOPHILS RELATIVE PERCENT: 1.2 %
BUN BLDV-MCNC: 6 MG/DL (ref 7–20)
CALCIUM SERPL-MCNC: 8.9 MG/DL (ref 8.3–10.6)
CHLORIDE BLD-SCNC: 103 MMOL/L (ref 99–110)
CO2: 22 MMOL/L (ref 21–32)
CREAT SERPL-MCNC: 0.6 MG/DL (ref 0.6–1.1)
EOSINOPHILS ABSOLUTE: 0.3 K/UL (ref 0–0.6)
EOSINOPHILS RELATIVE PERCENT: 4.7 %
GFR AFRICAN AMERICAN: >60
GFR NON-AFRICAN AMERICAN: >60
GLUCOSE BLD-MCNC: 102 MG/DL (ref 70–99)
HCT VFR BLD CALC: 41.3 % (ref 36–48)
HEMOGLOBIN: 13.8 G/DL (ref 12–16)
LYMPHOCYTES ABSOLUTE: 1 K/UL (ref 1–5.1)
LYMPHOCYTES RELATIVE PERCENT: 15.5 %
MCH RBC QN AUTO: 30.1 PG (ref 26–34)
MCHC RBC AUTO-ENTMCNC: 33.4 G/DL (ref 31–36)
MCV RBC AUTO: 89.9 FL (ref 80–100)
MONOCYTES ABSOLUTE: 0.7 K/UL (ref 0–1.3)
MONOCYTES RELATIVE PERCENT: 11.7 %
NEUTROPHILS ABSOLUTE: 4.2 K/UL (ref 1.7–7.7)
NEUTROPHILS RELATIVE PERCENT: 66.9 %
PDW BLD-RTO: 13.4 % (ref 12.4–15.4)
PLATELET # BLD: 303 K/UL (ref 135–450)
PMV BLD AUTO: 9.3 FL (ref 5–10.5)
POTASSIUM SERPL-SCNC: 4.4 MMOL/L (ref 3.5–5.1)
RBC # BLD: 4.59 M/UL (ref 4–5.2)
SODIUM BLD-SCNC: 137 MMOL/L (ref 136–145)
TROPONIN: <0.01 NG/ML
WBC # BLD: 6.3 K/UL (ref 4–11)

## 2019-11-16 PROCEDURE — 94640 AIRWAY INHALATION TREATMENT: CPT

## 2019-11-16 PROCEDURE — 2580000003 HC RX 258: Performed by: PHYSICIAN ASSISTANT

## 2019-11-16 PROCEDURE — 6370000000 HC RX 637 (ALT 250 FOR IP): Performed by: PHYSICIAN ASSISTANT

## 2019-11-16 PROCEDURE — 84484 ASSAY OF TROPONIN QUANT: CPT

## 2019-11-16 PROCEDURE — 80048 BASIC METABOLIC PNL TOTAL CA: CPT

## 2019-11-16 PROCEDURE — 71046 X-RAY EXAM CHEST 2 VIEWS: CPT

## 2019-11-16 PROCEDURE — 96360 HYDRATION IV INFUSION INIT: CPT

## 2019-11-16 PROCEDURE — 96361 HYDRATE IV INFUSION ADD-ON: CPT

## 2019-11-16 PROCEDURE — 93005 ELECTROCARDIOGRAM TRACING: CPT | Performed by: PHYSICIAN ASSISTANT

## 2019-11-16 PROCEDURE — 99284 EMERGENCY DEPT VISIT MOD MDM: CPT

## 2019-11-16 PROCEDURE — 85025 COMPLETE CBC W/AUTO DIFF WBC: CPT

## 2019-11-16 RX ORDER — PREDNISONE 10 MG/1
TABLET ORAL
Qty: 30 TABLET | Refills: 0 | Status: SHIPPED | OUTPATIENT
Start: 2019-11-16 | End: 2019-11-26

## 2019-11-16 RX ORDER — 0.9 % SODIUM CHLORIDE 0.9 %
1000 INTRAVENOUS SOLUTION INTRAVENOUS ONCE
Status: COMPLETED | OUTPATIENT
Start: 2019-11-16 | End: 2019-11-16

## 2019-11-16 RX ORDER — IPRATROPIUM BROMIDE AND ALBUTEROL SULFATE 2.5; .5 MG/3ML; MG/3ML
2 SOLUTION RESPIRATORY (INHALATION) ONCE
Status: COMPLETED | OUTPATIENT
Start: 2019-11-16 | End: 2019-11-16

## 2019-11-16 RX ORDER — GUAIFENESIN/DEXTROMETHORPHAN 100-10MG/5
10 SYRUP ORAL 3 TIMES DAILY PRN
Qty: 220 ML | Refills: 0 | Status: SHIPPED | OUTPATIENT
Start: 2019-11-16 | End: 2019-11-16 | Stop reason: SDUPTHER

## 2019-11-16 RX ORDER — GUAIFENESIN/DEXTROMETHORPHAN 100-10MG/5
10 SYRUP ORAL 3 TIMES DAILY PRN
Qty: 220 ML | Refills: 0 | Status: SHIPPED | OUTPATIENT
Start: 2019-11-16 | End: 2019-11-26

## 2019-11-16 RX ORDER — PREDNISONE 20 MG/1
60 TABLET ORAL ONCE
Status: COMPLETED | OUTPATIENT
Start: 2019-11-16 | End: 2019-11-16

## 2019-11-16 RX ORDER — ACETAMINOPHEN 500 MG
1000 TABLET ORAL ONCE
Status: COMPLETED | OUTPATIENT
Start: 2019-11-16 | End: 2019-11-16

## 2019-11-16 RX ORDER — PREDNISONE 10 MG/1
TABLET ORAL
Qty: 30 TABLET | Refills: 0 | Status: SHIPPED | OUTPATIENT
Start: 2019-11-16 | End: 2019-11-16 | Stop reason: SDUPTHER

## 2019-11-16 RX ADMIN — SODIUM CHLORIDE 1000 ML: 9 INJECTION, SOLUTION INTRAVENOUS at 09:20

## 2019-11-16 RX ADMIN — PREDNISONE 60 MG: 20 TABLET ORAL at 09:17

## 2019-11-16 RX ADMIN — ACETAMINOPHEN 1000 MG: 500 TABLET ORAL at 10:40

## 2019-11-16 RX ADMIN — IPRATROPIUM BROMIDE AND ALBUTEROL SULFATE 2 AMPULE: .5; 3 SOLUTION RESPIRATORY (INHALATION) at 09:26

## 2019-11-16 ASSESSMENT — ENCOUNTER SYMPTOMS
BACK PAIN: 0
FACIAL SWELLING: 0
EYE DISCHARGE: 0
SORE THROAT: 0
NAUSEA: 0
SHORTNESS OF BREATH: 0
EYE REDNESS: 0
ABDOMINAL PAIN: 0
APNEA: 0
VOMITING: 0
CHOKING: 0
WHEEZING: 1
COUGH: 1

## 2019-11-16 ASSESSMENT — PAIN SCALES - GENERAL
PAINLEVEL_OUTOF10: 0
PAINLEVEL_OUTOF10: 5
PAINLEVEL_OUTOF10: 0

## 2019-11-17 LAB
EKG ATRIAL RATE: 108 BPM
EKG DIAGNOSIS: NORMAL
EKG P AXIS: 63 DEGREES
EKG P-R INTERVAL: 156 MS
EKG Q-T INTERVAL: 318 MS
EKG QRS DURATION: 88 MS
EKG QTC CALCULATION (BAZETT): 426 MS
EKG R AXIS: 63 DEGREES
EKG T AXIS: 26 DEGREES
EKG VENTRICULAR RATE: 108 BPM

## 2019-11-17 PROCEDURE — 93010 ELECTROCARDIOGRAM REPORT: CPT | Performed by: INTERNAL MEDICINE

## 2019-12-27 ENCOUNTER — APPOINTMENT (OUTPATIENT)
Dept: CT IMAGING | Age: 34
DRG: 369 | End: 2019-12-27
Payer: COMMERCIAL

## 2019-12-27 ENCOUNTER — APPOINTMENT (OUTPATIENT)
Dept: GENERAL RADIOLOGY | Age: 34
DRG: 369 | End: 2019-12-27
Payer: COMMERCIAL

## 2019-12-27 ENCOUNTER — HOSPITAL ENCOUNTER (INPATIENT)
Age: 34
LOS: 3 days | Discharge: HOME | DRG: 369 | End: 2019-12-30
Attending: EMERGENCY MEDICINE | Admitting: INTERNAL MEDICINE
Payer: COMMERCIAL

## 2019-12-27 PROBLEM — K92.2 GI BLEED: Status: ACTIVE | Noted: 2019-12-27

## 2019-12-27 LAB
A/G RATIO: 1.3 (ref 1.1–2.2)
ALBUMIN SERPL-MCNC: 3.9 G/DL (ref 3.4–5)
ALP BLD-CCNC: 107 U/L (ref 40–129)
ALT SERPL-CCNC: 14 U/L (ref 10–40)
ANION GAP SERPL CALCULATED.3IONS-SCNC: 15 MMOL/L (ref 3–16)
AST SERPL-CCNC: 15 U/L (ref 15–37)
BASOPHILS ABSOLUTE: 0 K/UL (ref 0–0.2)
BASOPHILS RELATIVE PERCENT: 0.3 %
BILIRUB SERPL-MCNC: <0.2 MG/DL (ref 0–1)
BUN BLDV-MCNC: 9 MG/DL (ref 7–20)
CALCIUM SERPL-MCNC: 9.3 MG/DL (ref 8.3–10.6)
CHLORIDE BLD-SCNC: 100 MMOL/L (ref 99–110)
CO2: 25 MMOL/L (ref 21–32)
CREAT SERPL-MCNC: 0.7 MG/DL (ref 0.6–1.1)
EKG ATRIAL RATE: 85 BPM
EKG DIAGNOSIS: NORMAL
EKG P AXIS: 55 DEGREES
EKG P-R INTERVAL: 172 MS
EKG Q-T INTERVAL: 364 MS
EKG QRS DURATION: 86 MS
EKG QTC CALCULATION (BAZETT): 433 MS
EKG R AXIS: 60 DEGREES
EKG T AXIS: 51 DEGREES
EKG VENTRICULAR RATE: 85 BPM
EOSINOPHILS ABSOLUTE: 0.2 K/UL (ref 0–0.6)
EOSINOPHILS RELATIVE PERCENT: 3.4 %
GFR AFRICAN AMERICAN: >60
GFR NON-AFRICAN AMERICAN: >60
GLOBULIN: 3 G/DL
GLUCOSE BLD-MCNC: 100 MG/DL (ref 70–99)
HCG QUALITATIVE: NEGATIVE
HCG(URINE) PREGNANCY TEST: NEGATIVE
HCT VFR BLD CALC: 38.6 % (ref 36–48)
HEMOGLOBIN: 12.9 G/DL (ref 12–16)
HEMOGLOBIN: 14 G/DL (ref 12–16)
INR BLD: 0.98 (ref 0.86–1.14)
LIPASE: 19 U/L (ref 13–60)
LYMPHOCYTES ABSOLUTE: 1.7 K/UL (ref 1–5.1)
LYMPHOCYTES RELATIVE PERCENT: 37.6 %
MCH RBC QN AUTO: 30 PG (ref 26–34)
MCHC RBC AUTO-ENTMCNC: 33.4 G/DL (ref 31–36)
MCV RBC AUTO: 89.6 FL (ref 80–100)
MONOCYTES ABSOLUTE: 0.8 K/UL (ref 0–1.3)
MONOCYTES RELATIVE PERCENT: 17.4 %
NEUTROPHILS ABSOLUTE: 1.9 K/UL (ref 1.7–7.7)
NEUTROPHILS RELATIVE PERCENT: 41.3 %
OCCULT BLOOD DIAGNOSTIC: NORMAL
PDW BLD-RTO: 13.2 % (ref 12.4–15.4)
PLATELET # BLD: 319 K/UL (ref 135–450)
PMV BLD AUTO: 9.1 FL (ref 5–10.5)
POTASSIUM REFLEX MAGNESIUM: 3.6 MMOL/L (ref 3.5–5.1)
PROTHROMBIN TIME: 11.4 SEC (ref 10–13.2)
RBC # BLD: 4.31 M/UL (ref 4–5.2)
SODIUM BLD-SCNC: 140 MMOL/L (ref 136–145)
TOTAL PROTEIN: 6.9 G/DL (ref 6.4–8.2)
TROPONIN: <0.01 NG/ML
WBC # BLD: 4.5 K/UL (ref 4–11)

## 2019-12-27 PROCEDURE — 93010 ELECTROCARDIOGRAM REPORT: CPT | Performed by: INTERNAL MEDICINE

## 2019-12-27 PROCEDURE — 83690 ASSAY OF LIPASE: CPT

## 2019-12-27 PROCEDURE — 84703 CHORIONIC GONADOTROPIN ASSAY: CPT

## 2019-12-27 PROCEDURE — 36415 COLL VENOUS BLD VENIPUNCTURE: CPT

## 2019-12-27 PROCEDURE — 6360000002 HC RX W HCPCS: Performed by: INTERNAL MEDICINE

## 2019-12-27 PROCEDURE — 1200000000 HC SEMI PRIVATE

## 2019-12-27 PROCEDURE — 99285 EMERGENCY DEPT VISIT HI MDM: CPT

## 2019-12-27 PROCEDURE — 6370000000 HC RX 637 (ALT 250 FOR IP): Performed by: INTERNAL MEDICINE

## 2019-12-27 PROCEDURE — 96375 TX/PRO/DX INJ NEW DRUG ADDON: CPT

## 2019-12-27 PROCEDURE — C9113 INJ PANTOPRAZOLE SODIUM, VIA: HCPCS | Performed by: INTERNAL MEDICINE

## 2019-12-27 PROCEDURE — 2580000003 HC RX 258: Performed by: INTERNAL MEDICINE

## 2019-12-27 PROCEDURE — C9113 INJ PANTOPRAZOLE SODIUM, VIA: HCPCS | Performed by: EMERGENCY MEDICINE

## 2019-12-27 PROCEDURE — 74177 CT ABD & PELVIS W/CONTRAST: CPT

## 2019-12-27 PROCEDURE — 96376 TX/PRO/DX INJ SAME DRUG ADON: CPT

## 2019-12-27 PROCEDURE — 71260 CT THORAX DX C+: CPT

## 2019-12-27 PROCEDURE — 96366 THER/PROPH/DIAG IV INF ADDON: CPT

## 2019-12-27 PROCEDURE — 93005 ELECTROCARDIOGRAM TRACING: CPT | Performed by: EMERGENCY MEDICINE

## 2019-12-27 PROCEDURE — 84484 ASSAY OF TROPONIN QUANT: CPT

## 2019-12-27 PROCEDURE — 96365 THER/PROPH/DIAG IV INF INIT: CPT

## 2019-12-27 PROCEDURE — 6360000004 HC RX CONTRAST MEDICATION: Performed by: EMERGENCY MEDICINE

## 2019-12-27 PROCEDURE — 85025 COMPLETE CBC W/AUTO DIFF WBC: CPT

## 2019-12-27 PROCEDURE — G0328 FECAL BLOOD SCRN IMMUNOASSAY: HCPCS

## 2019-12-27 PROCEDURE — 85610 PROTHROMBIN TIME: CPT

## 2019-12-27 PROCEDURE — 85018 HEMOGLOBIN: CPT

## 2019-12-27 PROCEDURE — G0378 HOSPITAL OBSERVATION PER HR: HCPCS

## 2019-12-27 PROCEDURE — 80053 COMPREHEN METABOLIC PANEL: CPT

## 2019-12-27 PROCEDURE — 2500000003 HC RX 250 WO HCPCS: Performed by: EMERGENCY MEDICINE

## 2019-12-27 PROCEDURE — 71046 X-RAY EXAM CHEST 2 VIEWS: CPT

## 2019-12-27 PROCEDURE — 6360000002 HC RX W HCPCS: Performed by: EMERGENCY MEDICINE

## 2019-12-27 RX ORDER — ONDANSETRON 2 MG/ML
4 INJECTION INTRAMUSCULAR; INTRAVENOUS EVERY 6 HOURS PRN
Status: DISCONTINUED | OUTPATIENT
Start: 2019-12-27 | End: 2019-12-30 | Stop reason: HOSPADM

## 2019-12-27 RX ORDER — SODIUM CHLORIDE 0.9 % (FLUSH) 0.9 %
10 SYRINGE (ML) INJECTION EVERY 12 HOURS SCHEDULED
Status: DISCONTINUED | OUTPATIENT
Start: 2019-12-27 | End: 2019-12-30 | Stop reason: HOSPADM

## 2019-12-27 RX ORDER — ACETAMINOPHEN 325 MG/1
650 TABLET ORAL EVERY 4 HOURS PRN
Status: DISCONTINUED | OUTPATIENT
Start: 2019-12-27 | End: 2019-12-30 | Stop reason: HOSPADM

## 2019-12-27 RX ORDER — SODIUM CHLORIDE 0.9 % (FLUSH) 0.9 %
10 SYRINGE (ML) INJECTION PRN
Status: DISCONTINUED | OUTPATIENT
Start: 2019-12-27 | End: 2019-12-30 | Stop reason: HOSPADM

## 2019-12-27 RX ORDER — DIPHENHYDRAMINE HYDROCHLORIDE 50 MG/ML
25 INJECTION INTRAMUSCULAR; INTRAVENOUS ONCE
Status: COMPLETED | OUTPATIENT
Start: 2019-12-27 | End: 2019-12-27

## 2019-12-27 RX ORDER — TRAMADOL HYDROCHLORIDE 50 MG/1
100 TABLET ORAL EVERY 6 HOURS PRN
Status: DISCONTINUED | OUTPATIENT
Start: 2019-12-27 | End: 2019-12-29

## 2019-12-27 RX ORDER — PANTOPRAZOLE SODIUM 40 MG/10ML
80 INJECTION, POWDER, LYOPHILIZED, FOR SOLUTION INTRAVENOUS ONCE
Status: COMPLETED | OUTPATIENT
Start: 2019-12-27 | End: 2019-12-27

## 2019-12-27 RX ORDER — TRAMADOL HYDROCHLORIDE 50 MG/1
50 TABLET ORAL EVERY 6 HOURS PRN
Status: DISCONTINUED | OUTPATIENT
Start: 2019-12-27 | End: 2019-12-29

## 2019-12-27 RX ORDER — METHYLPREDNISOLONE SODIUM SUCCINATE 125 MG/2ML
125 INJECTION, POWDER, LYOPHILIZED, FOR SOLUTION INTRAMUSCULAR; INTRAVENOUS ONCE
Status: COMPLETED | OUTPATIENT
Start: 2019-12-27 | End: 2019-12-27

## 2019-12-27 RX ADMIN — SODIUM CHLORIDE 80 MG: 9 INJECTION, SOLUTION INTRAVENOUS at 17:24

## 2019-12-27 RX ADMIN — ONDANSETRON 4 MG: 2 INJECTION INTRAMUSCULAR; INTRAVENOUS at 20:07

## 2019-12-27 RX ADMIN — METHYLPREDNISOLONE SODIUM SUCCINATE 125 MG: 125 INJECTION, POWDER, FOR SOLUTION INTRAMUSCULAR; INTRAVENOUS at 06:31

## 2019-12-27 RX ADMIN — PANTOPRAZOLE SODIUM 80 MG: 40 INJECTION, POWDER, FOR SOLUTION INTRAVENOUS at 10:12

## 2019-12-27 RX ADMIN — SODIUM CHLORIDE 8 MG/HR: 9 INJECTION, SOLUTION INTRAVENOUS at 13:36

## 2019-12-27 RX ADMIN — SODIUM CHLORIDE, PRESERVATIVE FREE 10 ML: 5 INJECTION INTRAVENOUS at 20:07

## 2019-12-27 RX ADMIN — FAMOTIDINE 20 MG: 10 INJECTION, SOLUTION INTRAVENOUS at 06:31

## 2019-12-27 RX ADMIN — DIPHENHYDRAMINE HYDROCHLORIDE 25 MG: 50 INJECTION, SOLUTION INTRAMUSCULAR; INTRAVENOUS at 06:31

## 2019-12-27 RX ADMIN — TRAMADOL HYDROCHLORIDE 100 MG: 50 TABLET, FILM COATED ORAL at 20:07

## 2019-12-27 RX ADMIN — ONDANSETRON 4 MG: 2 INJECTION INTRAMUSCULAR; INTRAVENOUS at 15:19

## 2019-12-27 RX ADMIN — IOPAMIDOL 75 ML: 755 INJECTION, SOLUTION INTRAVENOUS at 06:16

## 2019-12-27 RX ADMIN — SODIUM CHLORIDE 8 MG/HR: 9 INJECTION, SOLUTION INTRAVENOUS at 22:57

## 2019-12-27 ASSESSMENT — PAIN - FUNCTIONAL ASSESSMENT
PAIN_FUNCTIONAL_ASSESSMENT: ACTIVITIES ARE NOT PREVENTED
PAIN_FUNCTIONAL_ASSESSMENT: ACTIVITIES ARE NOT PREVENTED

## 2019-12-27 ASSESSMENT — PAIN SCALES - GENERAL
PAINLEVEL_OUTOF10: 0
PAINLEVEL_OUTOF10: 2
PAINLEVEL_OUTOF10: 0
PAINLEVEL_OUTOF10: 5
PAINLEVEL_OUTOF10: 5
PAINLEVEL_OUTOF10: 8
PAINLEVEL_OUTOF10: 7

## 2019-12-27 ASSESSMENT — PAIN DESCRIPTION - FREQUENCY
FREQUENCY: CONTINUOUS

## 2019-12-27 ASSESSMENT — PAIN DESCRIPTION - PAIN TYPE
TYPE: ACUTE PAIN

## 2019-12-27 ASSESSMENT — PAIN DESCRIPTION - PROGRESSION
CLINICAL_PROGRESSION: NOT CHANGED
CLINICAL_PROGRESSION: GRADUALLY IMPROVING

## 2019-12-27 ASSESSMENT — PAIN DESCRIPTION - DESCRIPTORS
DESCRIPTORS: ACHING
DESCRIPTORS: SHARP

## 2019-12-27 ASSESSMENT — PAIN DESCRIPTION - LOCATION
LOCATION: CHEST;BACK
LOCATION: CHEST;BACK
LOCATION: BACK;CHEST
LOCATION: CHEST;BACK

## 2019-12-27 NOTE — ED TRIAGE NOTES
Patient here for stated posterior chest pain and nausea, with two episodes of bloody emesis. Patient states has not eaten anything red today or yesterday and that it was anya red, blood. Patient states was having some SOB today, and the posterior chest pain is just aching. Pain is a 5/10 currently, 8 at its worse, and that pain is stabbing. Patient continues with nausea, denies diarrhea. One known sick contact with C. Diff.     Patient is afebrile, pertinent hx of GERD, and takes omeprazole as needed

## 2019-12-27 NOTE — ED NOTES
Received report from [facility] [address] [employer] Pt is quietly resting and is not in any distress at this time.       Shannon Yao RN  12/27/19 3863

## 2019-12-27 NOTE — ED PROVIDER NOTES
travel. HPI    Nursing Notes were reviewed. REVIEW OF SYSTEMS    (2-9 systems for level 4, 10 or more for level 5)       Constitutional: Negative for fever or chills. HENT: Negative for rhinorrhea and sore throat. Eyes: Negative for redness or drainage. Respiratory: Negative for shortness of breath or dyspnea on exertion. Cardiovascular: Negative for chest pain. Genitourinary: Negative for flank pain. Negative for dysuria. Negative for hematuria. Neurological: Negative for headache. Musculoskeletal:  Negative edema. Hematological: Negative for adenopathy. All systems are reviewed and are negative except for those listed above in the history of present illness and ROS.         PAST MEDICAL HISTORY     Past Medical History:   Diagnosis Date    Asthma     dx'd at 23 yo, not well-controlled    Hypertension     with this pregnancy    Obesity     Pre-eclampsia     Pulmonary emboli ([facility] [address]          SURGICAL HISTORY       Past Surgical History:   Procedure Laterality Date     SECTION      x 3    CHOLECYSTECTOMY, LAPAROSCOPIC  2018     Laparoscopic cholecystectomy with cholangiogram    WISDOM TOOTH EXTRACTION           CURRENT MEDICATIONS       Previous Medications    ALBUTEROL SULFATE  (90 BASE) MCG/ACT INHALER    Inhale 2 puffs into the lungs every 6 hours as needed for Wheezing    DIPHENHYDRAMINE (BENADRYL) 25 MG CAPSULE    Take 1-2 capsules by mouth every 6 hours as needed for Itching    IBUPROFEN (ADVIL;MOTRIN) 800 MG TABLET    Take 1 tablet by mouth every 8 hours as needed for Pain    OMEPRAZOLE (PRILOSEC) 20 MG DELAYED RELEASE CAPSULE    Take 40 mg by mouth daily       ALLERGIES     Shellfish-derived products    FAMILY HISTORY       Family History   Problem Relation Age of Onset    High Blood Pressure Father     Diabetes Paternal Grandmother     High Blood Pressure Paternal Grandmother     Diabetes Paternal Grandfather     High Blood Pressure Paternal Grandfather     Asthma Mother     Cancer Maternal Grandmother           SOCIAL HISTORY       Social History     Socioeconomic History    Marital status:      Spouse name: Not on file    Number of children: Not on file    Years of education: Not on file    Highest education level: Not on file   Occupational History    Not on file   Social Needs    Financial resource strain: Not on file    Food insecurity:     Worry: Not on file     Inability: Not on file    Transportation needs:     Medical: Not on file     Non-medical: Not on file   Tobacco Use    Smoking status: Never Smoker    Smokeless tobacco: Never Used   Substance and Sexual Activity    Alcohol use: No     Comment: rarely    Drug use: No    Sexual activity: Yes     Partners: Male   Lifestyle    Physical activity:     Days per week: Not on file     Minutes per session: Not on file    Stress: Not on file   Relationships    Social connections:     Talks on phone: Not on file     Gets together: Not on file     Attends [employer] service: Not on file     Active member of club or organization: Not on file     Attends meetings of clubs or organizations: Not on file     Relationship status: Not on file    Intimate partner violence:     Fear of current or ex partner: Not on file     Emotionally abused: Not on file     Physically abused: Not on file     Forced sexual activity: Not on file   Other Topics Concern    Not on file   Social History Narrative    Not on file       SCREENINGS             PHYSICAL EXAM    (up to 7 for level 4, 8 or more for level 5)     ED Triage Vitals   BP Temp Temp Source Pulse Resp SpO2 Height Weight   12/27/19 0103 12/27/19 0103 12/27/19 0103 12/27/19 0103 12/27/19 0103 12/27/19 0103 12/27/19 0103 12/27/19 0459   (!) 173/101 97.1 °F (36.2 °C) Oral 91 16 99 % 5' 10\" (1.778 m) (!) 316 lb (143.3 kg)         Physical Exam   Constitutional: Awake and alert. Nontoxic. Moderate discomfort.   Head: No visible evidence of BEDSIDE ULTRASOUND:   Performed by ED Physician - none    LABS:  Labs Reviewed   COMPREHENSIVE METABOLIC PANEL W/ REFLEX TO MG FOR LOW K - Abnormal; Notable for the following components:       Result Value    Glucose 100 (*)     All other components within normal limits    Narrative:     Performed at:  [facility]  [address] [place] [address]   Phone [phone]   PREGNANCY, URINE    Narrative:     Performed at:  [facility]  [address] [place] [address]   Phone [phone]   CBC WITH AUTO DIFFERENTIAL    Narrative:     Performed at:  [facility]  [address] [facility] [place] [address]   Phone [phone]   TROPONIN    Narrative:     Performed at:  [facility]  [address] [place] [address] [facility] [place] [address]   Phone [phone]   LIPASE    Narrative:     Performed at:  [facility]  [facility] [address] [place] [address]   Phone [phone]   HCG, SERUM, QUALITATIVE    Narrative:     Performed at:  [facility]  [address] [place] [place] [address]   Phone [phone]   BLOOD OCCULT STOOL DIAGNOSTIC    Narrative:     ORDER#: [id number]                          ORDERED BY: EAGLE Zaman  SOURCE: Stool                              COLLECTED:  12/27/19 07:35  ANTIBIOTICS AT [place].:                      RECEIVED :  12/27/19 07:45  Performed at:  [facility]  [address] [place] [address]   Phone [phone]       All other labs were within normal range or not returned as of this dictation.     EMERGENCY DEPARTMENT COURSE and DIFFERENTIAL DIAGNOSIS/MDM:   Vitals:    Vitals:    12/27/19 0103 12/27/19 0459 12/27/19 0635   BP: (!) 173/101 (!) 167/65 (!) 147/69   Pulse: 91 81 78   Resp: 16 14 14   Temp: 97.1 °F (36.2 °C) 97.9 °F (36.6 °C) TempSrc: Oral Oral    SpO2: 99% 99%    Weight:  (!) 316 lb (143.3 kg)    Height: 5' 10\" (1.778 m)         The patient presented with back pain in the mid back worsened with inspiration. In addition, she has reported some hematemesis as noted above and a few days ago noticed some bright red blood in her stool. She is currently hemodynamically stable. No evidence of hypotension or tachycardia. She was given Pepcid 20 mg IV. She does have some mild subxiphoid tenderness on exam.    MDM    The patient does have a reported history of shellfish allergy in which she gets swelling of her lips remotely in the past.  However, she has had multiple CT imaging done in the past with IV contrast and has never had a problem and never required pretreatment. We will pretreat today with Benadryl 25 mg IV and Solu-Medrol 125 mg IV just to be sure. Was discussed at length with the patient and she agrees to proceed. Benefits of CT imaging outweigh the risk. Rectal exam was completed with the nurse present. Dark brown stool residue was noted in the rectal vault. No melena. No bright red blood. Hemoccult is pending. Nontender. No hemorrhoids. REASSESSMENT          7:49 AM: Patient still reported some abdominal and back pain. The etiology of her symptoms is unclear, however, given her report of hematemesis and blood in the stool, this does raise the suspicion for esophagitis, gastritis, or peptic ulcer disease. Patient is uncomfortable going home. She I suspect that she will need further evaluation with endoscopy. She was also given Protonix 80 mg IV. She will be admitted for further treatment and evaluation. CRITICAL CARE TIME   Total Critical Care time was 0 minutes, excluding separately reportable procedures. There was a high probability of clinically significant/life threatening deterioration in the patient's condition which required my urgent intervention.       CONSULTS:  None    PROCEDURES:  Unless

## 2019-12-27 NOTE — PROGRESS NOTES
Pt arrived to unit, c/o vomiting bright red blood twice in the past week. No c/o pain currently, pt does complain of nausea. A&Ox4, UAL, call light in reach. Pt oriented to room. Will continue to monitor.

## 2019-12-27 NOTE — PLAN OF CARE
Problem: Pain:  Goal: Pain level will decrease  Description  Pain level will decrease  Outcome: Ongoing  Will continue to use the pain scale (0-10) to measure pt's pain and monitor their needs. Will assess patients pain with assessments and interactions. Pt will notify RN of any changes in pain and where. Will continue to perform therapeutic comfort measures and give pain medications as prescribed/needed.      Goal: Control of acute pain  Description  Control of acute pain  Outcome: Ongoing  Goal: Control of chronic pain  Description  Control of chronic pain  Outcome: Ongoing

## 2019-12-27 NOTE — PROGRESS NOTES
4 Eyes Skin Assessment     The patient is being assess for  Admission    I agree that 2 RN's have performed a thorough Head to Toe Skin Assessment on the patient. ALL assessment sites listed below have been assessed. Areas assessed by both nurses:   [x]   Head, Face, and Ears   [x]   Shoulders, Back, and Chest  [x]   Arms, Elbows, and Hands   [x]   Coccyx, Sacrum, and IschIum  [x]   Legs, Feet, and Heels        Does the Patient have Skin Breakdown?   No         Scot Prevention initiated:  NA   Wound Care Orders initiated:  NA      [facility] nurse consulted for Pressure Injury (Stage 3,4, Unstageable, DTI, NWPT, and Complex wounds), New and Established Ostomies:  NA      Nurse 1 eSignature: Electronically signed by Viola Dill RN on 12/27/19 at 3:01 PM    **SHARE this note so that the co-signing nurse is able to place an eSignature**    Nurse 2 eSignature: Electronically signed by Vy Newman RN on 12/27/19 at 5:41 PM

## 2019-12-27 NOTE — H&P
Hospital Medicine History & Physical      PCP: Ho Montez MD    Date of Admission: 2019    Date of Service: Pt seen/examined on 2019 and Admitted to Inpatient    Chief Complaint:  Hematemesis. History Of Present Illness: The patient is a 27 y.o. female w Hx of GERD, who presents to [facility] with hematemesis. Pt reports over the course of past week was having epigastric and mid upper back pain and was taking 800mg of ibuprofen a few times every day for the pain. Yesterday developed nausea and had 3 episodes of emesis. Last 2 of those were \"nothing but anya blood\" per patient. Pt reports occasional blood in stool - streaks on toilet paper. Not on her period. Pt recently treated for URI and was on systemic steroid. Past Medical History:        Diagnosis Date    Asthma     dx'd at 23 yo, not well-controlled    Hypertension     with this pregnancy    Obesity     Pre-eclampsia     Pulmonary emboli (HCC)        Past Surgical History:        Procedure Laterality Date     SECTION      x 3    CHOLECYSTECTOMY, LAPAROSCOPIC  2018     Laparoscopic cholecystectomy with cholangiogram    WISDOM TOOTH EXTRACTION         Medications Prior to Admission:    Prior to Admission medications    Medication Sig Start Date End Date Taking?  Authorizing Provider   diphenhydrAMINE (BENADRYL) 25 MG capsule Take 1-2 capsules by mouth every 6 hours as needed for Itching 18   Fernandez Ma MD   ibuprofen (ADVIL;MOTRIN) 800 MG tablet Take 1 tablet by mouth every 8 hours as needed for Pain 18   OLIVERIO Morrison - CNP   omeprazole (PRILOSEC) 20 MG delayed release capsule Take 40 mg by mouth daily    Historical Provider, MD   albuterol sulfate  (90 BASE) MCG/ACT inhaler Inhale 2 puffs into the lungs every

## 2019-12-28 LAB
ANION GAP SERPL CALCULATED.3IONS-SCNC: 13 MMOL/L (ref 3–16)
BUN BLDV-MCNC: 8 MG/DL (ref 7–20)
CALCIUM SERPL-MCNC: 8.9 MG/DL (ref 8.3–10.6)
CHLORIDE BLD-SCNC: 100 MMOL/L (ref 99–110)
CO2: 22 MMOL/L (ref 21–32)
CREAT SERPL-MCNC: 0.6 MG/DL (ref 0.6–1.1)
GFR AFRICAN AMERICAN: >60
GFR NON-AFRICAN AMERICAN: >60
GLUCOSE BLD-MCNC: 100 MG/DL (ref 70–99)
HEMOGLOBIN: 13.3 G/DL (ref 12–16)
MAGNESIUM: 2 MG/DL (ref 1.8–2.4)
ORGANISM: ABNORMAL
POTASSIUM REFLEX MAGNESIUM: 3.4 MMOL/L (ref 3.5–5.1)
POTASSIUM REFLEX MAGNESIUM: 3.7 MMOL/L (ref 3.5–5.1)
REPORT: NORMAL
RESPIRATORY PANEL PCR: ABNORMAL
SODIUM BLD-SCNC: 135 MMOL/L (ref 136–145)

## 2019-12-28 PROCEDURE — 36415 COLL VENOUS BLD VENIPUNCTURE: CPT

## 2019-12-28 PROCEDURE — G0378 HOSPITAL OBSERVATION PER HR: HCPCS

## 2019-12-28 PROCEDURE — 6370000000 HC RX 637 (ALT 250 FOR IP): Performed by: INTERNAL MEDICINE

## 2019-12-28 PROCEDURE — C9113 INJ PANTOPRAZOLE SODIUM, VIA: HCPCS | Performed by: INTERNAL MEDICINE

## 2019-12-28 PROCEDURE — 96366 THER/PROPH/DIAG IV INF ADDON: CPT

## 2019-12-28 PROCEDURE — 6360000002 HC RX W HCPCS: Performed by: INTERNAL MEDICINE

## 2019-12-28 PROCEDURE — 80048 BASIC METABOLIC PNL TOTAL CA: CPT

## 2019-12-28 PROCEDURE — 2580000003 HC RX 258: Performed by: INTERNAL MEDICINE

## 2019-12-28 PROCEDURE — 96375 TX/PRO/DX INJ NEW DRUG ADDON: CPT

## 2019-12-28 PROCEDURE — 84132 ASSAY OF SERUM POTASSIUM: CPT

## 2019-12-28 PROCEDURE — 96376 TX/PRO/DX INJ SAME DRUG ADON: CPT

## 2019-12-28 PROCEDURE — 83735 ASSAY OF MAGNESIUM: CPT

## 2019-12-28 PROCEDURE — 85018 HEMOGLOBIN: CPT

## 2019-12-28 PROCEDURE — 0100U HC RESPIRPTHGN MULT REV TRANS & AMP PRB TECH 21 TRGT: CPT

## 2019-12-28 PROCEDURE — 1200000000 HC SEMI PRIVATE

## 2019-12-28 RX ORDER — OSELTAMIVIR PHOSPHATE 75 MG/1
75 CAPSULE ORAL 2 TIMES DAILY
Status: DISCONTINUED | OUTPATIENT
Start: 2019-12-28 | End: 2019-12-30 | Stop reason: HOSPADM

## 2019-12-28 RX ORDER — POTASSIUM CHLORIDE 7.45 MG/ML
10 INJECTION INTRAVENOUS PRN
Status: DISCONTINUED | OUTPATIENT
Start: 2019-12-28 | End: 2019-12-30 | Stop reason: HOSPADM

## 2019-12-28 RX ADMIN — POTASSIUM CHLORIDE 10 MEQ: 7.46 INJECTION, SOLUTION INTRAVENOUS at 15:06

## 2019-12-28 RX ADMIN — POTASSIUM CHLORIDE 10 MEQ: 7.46 INJECTION, SOLUTION INTRAVENOUS at 13:06

## 2019-12-28 RX ADMIN — ONDANSETRON 4 MG: 2 INJECTION INTRAMUSCULAR; INTRAVENOUS at 14:33

## 2019-12-28 RX ADMIN — OSELTAMIVIR PHOSPHATE 75 MG: 75 CAPSULE ORAL at 20:41

## 2019-12-28 RX ADMIN — SODIUM CHLORIDE, PRESERVATIVE FREE 10 ML: 5 INJECTION INTRAVENOUS at 08:46

## 2019-12-28 RX ADMIN — TRAMADOL HYDROCHLORIDE 100 MG: 50 TABLET, FILM COATED ORAL at 20:42

## 2019-12-28 RX ADMIN — SODIUM CHLORIDE 8 MG/HR: 9 INJECTION, SOLUTION INTRAVENOUS at 08:45

## 2019-12-28 RX ADMIN — ONDANSETRON 4 MG: 2 INJECTION INTRAMUSCULAR; INTRAVENOUS at 08:45

## 2019-12-28 RX ADMIN — TRAMADOL HYDROCHLORIDE 50 MG: 50 TABLET, FILM COATED ORAL at 08:45

## 2019-12-28 RX ADMIN — POTASSIUM CHLORIDE 10 MEQ: 7.46 INJECTION, SOLUTION INTRAVENOUS at 09:54

## 2019-12-28 RX ADMIN — POTASSIUM CHLORIDE 10 MEQ: 7.46 INJECTION, SOLUTION INTRAVENOUS at 11:42

## 2019-12-28 RX ADMIN — SODIUM CHLORIDE 8 MG/HR: 9 INJECTION, SOLUTION INTRAVENOUS at 19:36

## 2019-12-28 ASSESSMENT — PAIN SCALES - GENERAL
PAINLEVEL_OUTOF10: 2
PAINLEVEL_OUTOF10: 2
PAINLEVEL_OUTOF10: 7
PAINLEVEL_OUTOF10: 0

## 2019-12-28 ASSESSMENT — PAIN - FUNCTIONAL ASSESSMENT: PAIN_FUNCTIONAL_ASSESSMENT: ACTIVITIES ARE NOT PREVENTED

## 2019-12-28 ASSESSMENT — PAIN DESCRIPTION - LOCATION
LOCATION: BACK;CHEST
LOCATION: BACK;CHEST

## 2019-12-28 ASSESSMENT — PAIN DESCRIPTION - ONSET: ONSET: ON-GOING

## 2019-12-28 ASSESSMENT — PAIN DESCRIPTION - PROGRESSION: CLINICAL_PROGRESSION: GRADUALLY IMPROVING

## 2019-12-28 ASSESSMENT — PAIN DESCRIPTION - FREQUENCY: FREQUENCY: CONTINUOUS

## 2019-12-28 ASSESSMENT — PAIN DESCRIPTION - PAIN TYPE: TYPE: ACUTE PAIN

## 2019-12-28 ASSESSMENT — PAIN DESCRIPTION - ORIENTATION: ORIENTATION: MID

## 2019-12-28 ASSESSMENT — PAIN DESCRIPTION - DESCRIPTORS
DESCRIPTORS: ACHING
DESCRIPTORS: ACHING;DISCOMFORT

## 2019-12-28 NOTE — PLAN OF CARE
Problem: Pain:  Goal: Pain level will decrease  Description  Pain level will decrease  12/28/2019 1400 by Severiano Genre, RN  Outcome: Ongoing  12/28/2019 0029 by Chaka Hanna RN  Outcome: Ongoing  Goal: Control of acute pain  Description  Control of acute pain  Outcome: Ongoing  Goal: Control of chronic pain  Description  Control of chronic pain  Outcome: Ongoing     Problem: Bleeding:  Goal: Will show no signs and symptoms of excessive bleeding  Description  Will show no signs and symptoms of excessive bleeding  12/28/2019 0029 by Chaka Hanna RN  Outcome: Ongoing     Problem: Cardiovascular  Goal: No DVT, peripheral vascular complications  12/31/8109 0029 by Chaka Hanna RN  Outcome: Ongoing

## 2019-12-28 NOTE — PROGRESS NOTES
grossly non-focal.  Psychiatric: Alert and oriented, thought content appropriate, normal insight  Capillary Refill: Brisk,< 3 seconds   Peripheral Pulses: +2 palpable, equal bilaterally       Labs:   Recent Labs     12/27/19  0115 12/27/19  1838 12/28/19  0041 12/28/19  0642   WBC 4.5  --   --   --    HGB 12.9 14.0 13.3 13.3   HCT 38.6  --   --   --      --   --   --      Recent Labs     12/27/19  0115 12/28/19  0642    135*   K 3.6 3.4*    100   CO2 25 22   BUN 9 8   CREATININE 0.7 0.6   CALCIUM 9.3 8.9     Recent Labs     12/27/19  0115   AST 15   ALT 14   BILITOT <0.2   ALKPHOS 107     Recent Labs     12/27/19  1338   INR 0.98     Recent Labs     12/27/19 0115   TROPONINI <0.01       Urinalysis:      Lab Results   Component Value Date    NITRU Negative 09/26/2019    WBCUA 3 09/26/2019    BACTERIA 1+ 09/26/2019    RBCUA 3 09/26/2019    BLOODU MODERATE 09/26/2019    SPECGRAV 1.025 09/26/2019    GLUCOSEU Negative 09/26/2019       Radiology:  CT ABDOMEN PELVIS W IV CONTRAST Additional Contrast? None   Final Result   1. No acute abnormality. CT CHEST PULMONARY EMBOLISM W CONTRAST   Final Result   1. No acute abnormality. XR CHEST STANDARD (2 VW)   Final Result   Unremarkable. Assessment/Plan:    Active Hospital Problems    Diagnosis    GI bleed [K92.2]     Hematemesis  Possible PUD with recent NSAID use, possible Amaris-Ga tear. Hemoglobin stable. FOBT negative. Imaging in the ED negative. - GI consult  - IV PPI  - trend H/H    Influenza B  Positive on respiratory viral panel.  -Start Tamiflu    Asthma  No wheezing on exam today with good air movement. Will need to monitor given diagnosis of influenza  -Continue inhalers    Morbid Obesity  Body mass index is 45.96 kg/m². Counseled on effects of weight on overall health and chronic medical conditions and discussed weight loss.        DVT Prophylaxis: SCD  Diet: Diet NPO Effective Now Exceptions are: Belkis and Jaylen, Sips with Meds, Popsicles, Other (See Comment)  Code Status: Full Code    PT/OT Eval Status: deferred    Dispo - pending    Alison Prieto MD    This note was transcribed using [id number] [employer]. Please disregard any translational errors.

## 2019-12-28 NOTE — PLAN OF CARE
Problem: Pain:  Goal: Pain level will decrease  Description  Pain level will decrease  12/28/2019 0029 by Nilo Aragon RN  Outcome: Ongoing  12/27/2019 1511 by Yue Kraus RN  Outcome: Ongoing   Pain/discomfort being managed with PRN analgesics per MD orders. Pt able to express presence and absence of pain and rate pain appropriately using numerical scale. Problem: Bleeding:  Goal: Will show no signs and symptoms of excessive bleeding  Description  Will show no signs and symptoms of excessive bleeding  Outcome: Ongoing   Protonix drip as ordered. Monitor for S/S of bleeding. Problem: Cardiovascular  Goal: No DVT, peripheral vascular complications  Outcome: Ongoing   A-boots as ordered.

## 2019-12-29 LAB
ALBUMIN SERPL-MCNC: 3.8 G/DL (ref 3.4–5)
ANION GAP SERPL CALCULATED.3IONS-SCNC: 12 MMOL/L (ref 3–16)
BUN BLDV-MCNC: 9 MG/DL (ref 7–20)
CALCIUM SERPL-MCNC: 8.6 MG/DL (ref 8.3–10.6)
CHLORIDE BLD-SCNC: 99 MMOL/L (ref 99–110)
CO2: 25 MMOL/L (ref 21–32)
CREAT SERPL-MCNC: 0.7 MG/DL (ref 0.6–1.1)
GFR AFRICAN AMERICAN: >60
GFR NON-AFRICAN AMERICAN: >60
GLUCOSE BLD-MCNC: 90 MG/DL (ref 70–99)
HCT VFR BLD CALC: 40.2 % (ref 36–48)
HEMOGLOBIN: 13.2 G/DL (ref 12–16)
MAGNESIUM: 1.9 MG/DL (ref 1.8–2.4)
MCH RBC QN AUTO: 29.5 PG (ref 26–34)
MCHC RBC AUTO-ENTMCNC: 32.7 G/DL (ref 31–36)
MCV RBC AUTO: 90.2 FL (ref 80–100)
PDW BLD-RTO: 13.4 % (ref 12.4–15.4)
PHOSPHORUS: 4.2 MG/DL (ref 2.5–4.9)
PLATELET # BLD: 289 K/UL (ref 135–450)
PMV BLD AUTO: 8.7 FL (ref 5–10.5)
POTASSIUM SERPL-SCNC: 4.1 MMOL/L (ref 3.5–5.1)
RBC # BLD: 4.46 M/UL (ref 4–5.2)
SODIUM BLD-SCNC: 136 MMOL/L (ref 136–145)
TROPONIN: <0.01 NG/ML
WBC # BLD: 5.2 K/UL (ref 4–11)

## 2019-12-29 PROCEDURE — 6370000000 HC RX 637 (ALT 250 FOR IP): Performed by: HOSPITALIST

## 2019-12-29 PROCEDURE — 36415 COLL VENOUS BLD VENIPUNCTURE: CPT

## 2019-12-29 PROCEDURE — 83735 ASSAY OF MAGNESIUM: CPT

## 2019-12-29 PROCEDURE — 6370000000 HC RX 637 (ALT 250 FOR IP): Performed by: INTERNAL MEDICINE

## 2019-12-29 PROCEDURE — 94760 N-INVAS EAR/PLS OXIMETRY 1: CPT

## 2019-12-29 PROCEDURE — 80069 RENAL FUNCTION PANEL: CPT

## 2019-12-29 PROCEDURE — 6360000002 HC RX W HCPCS: Performed by: INTERNAL MEDICINE

## 2019-12-29 PROCEDURE — 1200000000 HC SEMI PRIVATE

## 2019-12-29 PROCEDURE — 94761 N-INVAS EAR/PLS OXIMETRY MLT: CPT

## 2019-12-29 PROCEDURE — 96376 TX/PRO/DX INJ SAME DRUG ADON: CPT

## 2019-12-29 PROCEDURE — 84484 ASSAY OF TROPONIN QUANT: CPT

## 2019-12-29 PROCEDURE — C9113 INJ PANTOPRAZOLE SODIUM, VIA: HCPCS | Performed by: INTERNAL MEDICINE

## 2019-12-29 PROCEDURE — 96366 THER/PROPH/DIAG IV INF ADDON: CPT

## 2019-12-29 PROCEDURE — 93005 ELECTROCARDIOGRAM TRACING: CPT | Performed by: HOSPITALIST

## 2019-12-29 PROCEDURE — G0378 HOSPITAL OBSERVATION PER HR: HCPCS

## 2019-12-29 PROCEDURE — 2580000003 HC RX 258: Performed by: INTERNAL MEDICINE

## 2019-12-29 PROCEDURE — 94640 AIRWAY INHALATION TREATMENT: CPT

## 2019-12-29 PROCEDURE — 85027 COMPLETE CBC AUTOMATED: CPT

## 2019-12-29 RX ORDER — TRAMADOL HYDROCHLORIDE 50 MG/1
50 TABLET ORAL EVERY 6 HOURS PRN
Status: DISCONTINUED | OUTPATIENT
Start: 2019-12-29 | End: 2019-12-30 | Stop reason: HOSPADM

## 2019-12-29 RX ORDER — PANTOPRAZOLE SODIUM 40 MG/1
40 TABLET, DELAYED RELEASE ORAL
Status: DISCONTINUED | OUTPATIENT
Start: 2019-12-29 | End: 2019-12-30 | Stop reason: HOSPADM

## 2019-12-29 RX ORDER — OSELTAMIVIR PHOSPHATE 75 MG/1
75 CAPSULE ORAL 2 TIMES DAILY
Qty: 10 CAPSULE | Refills: 0 | Status: SHIPPED | OUTPATIENT
Start: 2019-12-29 | End: 2019-12-30

## 2019-12-29 RX ORDER — BUDESONIDE AND FORMOTEROL FUMARATE DIHYDRATE 160; 4.5 UG/1; UG/1
2 AEROSOL RESPIRATORY (INHALATION) 2 TIMES DAILY
COMMUNITY
End: 2020-12-15

## 2019-12-29 RX ORDER — HYDROCHLOROTHIAZIDE 25 MG/1
25 TABLET ORAL DAILY
Status: DISCONTINUED | OUTPATIENT
Start: 2019-12-29 | End: 2019-12-30 | Stop reason: HOSPADM

## 2019-12-29 RX ORDER — IPRATROPIUM BROMIDE AND ALBUTEROL SULFATE 2.5; .5 MG/3ML; MG/3ML
1 SOLUTION RESPIRATORY (INHALATION) EVERY 4 HOURS PRN
Status: DISCONTINUED | OUTPATIENT
Start: 2019-12-29 | End: 2019-12-30 | Stop reason: HOSPADM

## 2019-12-29 RX ADMIN — OSELTAMIVIR PHOSPHATE 75 MG: 75 CAPSULE ORAL at 20:32

## 2019-12-29 RX ADMIN — ACETAMINOPHEN 650 MG: 325 TABLET ORAL at 11:02

## 2019-12-29 RX ADMIN — SODIUM CHLORIDE, PRESERVATIVE FREE 10 ML: 5 INJECTION INTRAVENOUS at 20:32

## 2019-12-29 RX ADMIN — SODIUM CHLORIDE, PRESERVATIVE FREE 10 ML: 5 INJECTION INTRAVENOUS at 08:17

## 2019-12-29 RX ADMIN — SODIUM CHLORIDE 8 MG/HR: 9 INJECTION, SOLUTION INTRAVENOUS at 05:40

## 2019-12-29 RX ADMIN — IPRATROPIUM BROMIDE AND ALBUTEROL SULFATE 1 AMPULE: .5; 3 SOLUTION RESPIRATORY (INHALATION) at 09:53

## 2019-12-29 RX ADMIN — HYDROCHLOROTHIAZIDE 25 MG: 25 TABLET ORAL at 14:15

## 2019-12-29 RX ADMIN — OSELTAMIVIR PHOSPHATE 75 MG: 75 CAPSULE ORAL at 08:17

## 2019-12-29 RX ADMIN — TRAMADOL HYDROCHLORIDE 100 MG: 50 TABLET, FILM COATED ORAL at 02:04

## 2019-12-29 RX ADMIN — ONDANSETRON 4 MG: 2 INJECTION INTRAMUSCULAR; INTRAVENOUS at 14:15

## 2019-12-29 RX ADMIN — PANTOPRAZOLE SODIUM 40 MG: 40 TABLET, DELAYED RELEASE ORAL at 17:06

## 2019-12-29 ASSESSMENT — PAIN DESCRIPTION - PROGRESSION: CLINICAL_PROGRESSION: NOT CHANGED

## 2019-12-29 ASSESSMENT — PAIN DESCRIPTION - ONSET: ONSET: PROGRESSIVE

## 2019-12-29 ASSESSMENT — PAIN DESCRIPTION - DESCRIPTORS
DESCRIPTORS: HEADACHE
DESCRIPTORS: STABBING

## 2019-12-29 ASSESSMENT — PAIN DESCRIPTION - PAIN TYPE
TYPE: ACUTE PAIN
TYPE: ACUTE PAIN

## 2019-12-29 ASSESSMENT — PAIN SCALES - GENERAL
PAINLEVEL_OUTOF10: 8
PAINLEVEL_OUTOF10: 8
PAINLEVEL_OUTOF10: 0

## 2019-12-29 ASSESSMENT — PAIN DESCRIPTION - LOCATION
LOCATION: HEAD
LOCATION: CHEST

## 2019-12-29 ASSESSMENT — PAIN - FUNCTIONAL ASSESSMENT: PAIN_FUNCTIONAL_ASSESSMENT: PREVENTS OR INTERFERES SOME ACTIVE ACTIVITIES AND ADLS

## 2019-12-29 ASSESSMENT — PAIN DESCRIPTION - FREQUENCY: FREQUENCY: INTERMITTENT

## 2019-12-29 ASSESSMENT — PAIN DESCRIPTION - ORIENTATION: ORIENTATION: MID

## 2019-12-29 ASSESSMENT — PAIN DESCRIPTION - DIRECTION: RADIATING_TOWARDS: BACK

## 2019-12-29 NOTE — PROGRESS NOTES
[facility]  Full note dictated    IMP:  Hematemesis  Chronic ibuprofen use  She had a small amount of hematemesis just this morning  Mild epigastric tenderness on exam  Fortunately, the patient has remained hemodynamically stable and the Hgb has not dropped significantly    REC:  Keep in the hospital overnight  PO pantoprazole  EGD  tomorrow  Plan discussed with patient

## 2019-12-29 NOTE — PLAN OF CARE
Problem: Pain:  Goal: Pain level will decrease  Description  Pain level will decrease  12/29/2019 0005 by Latisha Anglin RN  Outcome: Ongoing  12/28/2019 1400 by Burgess Marlin RN  Outcome: Ongoing   Pain/discomfort being managed with PRN analgesics per MD orders. Pt able to express presence and absence of pain and rate pain appropriately using numerical scale. Problem: Bleeding:  Goal: Will show no signs and symptoms of excessive bleeding  Description  Will show no signs and symptoms of excessive bleeding  Outcome: Ongoing   Monitor for bleeding. Protonix drip as ordered. Problem: Cardiovascular  Goal: No DVT, peripheral vascular complications  Outcome: Ongoing   A-boots as ordered. Problem: Activity:  Goal: Energy level will increase  Description  Energy level will increase  Outcome: Ongoing     Problem: Activity:  Goal: Ability to return to normal activity level will improve  Description  Ability to return to normal activity level will improve  Outcome: Ongoing     Problem: Respiratory:  Goal: Respiratory status will improve  Description  Respiratory status will improve  Outcome: Ongoing   Tamiflu as ordered. Pt will maintain absence of respiratory complications. Oxygen saturations >90% at all times with supplemental O2 as needed. Will assess respiratory status every shift and PRN. Encourage to cough and deep breath.

## 2019-12-29 NOTE — PROGRESS NOTES
12.9   < > 13.3 13.3 13.2   HCT 38.6  --   --   --  40.2   MCV 89.6  --   --   --  90.2     --   --   --  289    < > = values in this interval not displayed. BMP:   Recent Labs     12/27/19  0115 12/28/19  0642 12/28/19  1714 12/29/19  0710    135*  --  136   K 3.6 3.4* 3.7 4.1    100  --  99   CO2 25 22  --  25   PHOS  --   --   --  4.2   BUN 9 8  --  9   CREATININE 0.7 0.6  --  0.7     Mag: No results for input(s): MAG in the last 72 hours. Phos:   Lab Results   Component Value Date    PHOS 4.2 12/29/2019     No components found for: GLU    LIVER PROFILE:   Recent Labs     12/27/19  0115   AST 15   ALT 14   LIPASE 19.0   BILITOT <0.2   ALKPHOS 107     PT/INR:   Recent Labs     12/27/19  1338   PROTIME 11.4   INR 0.98     APTT: No results for input(s): APTT in the last 72 hours. UA:No results for input(s): NITRITE, COLORU, PHUR, LABCAST, WBCUA, RBCUA, MUCUS, TRICHOMONAS, YEAST, BACTERIA, CLARITYU, SPECGRAV, LEUKOCYTESUR, UROBILINOGEN, BILIRUBINUR, BLOODU, GLUCOSEU, AMORPHOUS in the last 72 hours. Invalid input(s): Kimberley Llamas input(s): ABG  Lab Results   Component Value Date    CALCIUM 8.6 12/29/2019    PHOS 4.2 12/29/2019       Assessment and Plan:    Hematemesis:   With chronic NSAIDs use. Seen by GI:  plan for EGD in the morning. Hemoglobin is a stable  Continue with PPI    Acute influenza B infection  With history of asthma we will treat. Tamiflu for 5 days    Chest pain:  CTA is negative. Troponin on admission and EKG on admission were negative. We will check 1 more troponin and EKG. Could be related to her GI problem versus musculoskeletal with recurrent cough due to influenza    Hypertension  Start hydrochlorothiazide    Asthma without acute exacerbation  Continue with home meds      Morbid obesity  Body mass index is 45.96 kg/m².   Complicating her medical problems  Weight loss counseling was provided    Code status: Full code  DVT prophylaxis: SCDs  Disposition: Pending clinical improvement    Electronically signed by Federico Altman MD on 12/29/2019 at 2:22 PM

## 2019-12-30 ENCOUNTER — ANESTHESIA EVENT (OUTPATIENT)
Dept: ENDOSCOPY | Age: 34
DRG: 369 | End: 2019-12-30
Payer: COMMERCIAL

## 2019-12-30 ENCOUNTER — ANESTHESIA (OUTPATIENT)
Dept: ENDOSCOPY | Age: 34
DRG: 369 | End: 2019-12-30
Payer: COMMERCIAL

## 2019-12-30 VITALS
SYSTOLIC BLOOD PRESSURE: 139 MMHG | DIASTOLIC BLOOD PRESSURE: 88 MMHG | RESPIRATION RATE: 17 BRPM | TEMPERATURE: 98 F | OXYGEN SATURATION: 99 %

## 2019-12-30 LAB
ALBUMIN SERPL-MCNC: 4.1 G/DL (ref 3.4–5)
ANION GAP SERPL CALCULATED.3IONS-SCNC: 18 MMOL/L (ref 3–16)
BASOPHILS ABSOLUTE: 0.1 K/UL (ref 0–0.2)
BASOPHILS RELATIVE PERCENT: 1.4 %
BUN BLDV-MCNC: 8 MG/DL (ref 7–20)
CALCIUM SERPL-MCNC: 9.6 MG/DL (ref 8.3–10.6)
CHLORIDE BLD-SCNC: 96 MMOL/L (ref 99–110)
CO2: 22 MMOL/L (ref 21–32)
CREAT SERPL-MCNC: 0.7 MG/DL (ref 0.6–1.1)
EKG ATRIAL RATE: 85 BPM
EKG DIAGNOSIS: NORMAL
EKG P AXIS: 50 DEGREES
EKG P-R INTERVAL: 174 MS
EKG Q-T INTERVAL: 350 MS
EKG QRS DURATION: 88 MS
EKG QTC CALCULATION (BAZETT): 416 MS
EKG R AXIS: 48 DEGREES
EKG T AXIS: 38 DEGREES
EKG VENTRICULAR RATE: 85 BPM
EOSINOPHILS ABSOLUTE: 0.2 K/UL (ref 0–0.6)
EOSINOPHILS RELATIVE PERCENT: 4 %
GFR AFRICAN AMERICAN: >60
GFR NON-AFRICAN AMERICAN: >60
GLUCOSE BLD-MCNC: 97 MG/DL (ref 70–99)
HCG(URINE) PREGNANCY TEST: NEGATIVE
HCT VFR BLD CALC: 42.4 % (ref 36–48)
HEMOGLOBIN: 14.4 G/DL (ref 12–16)
LYMPHOCYTES ABSOLUTE: 1.6 K/UL (ref 1–5.1)
LYMPHOCYTES RELATIVE PERCENT: 34.1 %
MAGNESIUM: 1.7 MG/DL (ref 1.8–2.4)
MCH RBC QN AUTO: 30.4 PG (ref 26–34)
MCHC RBC AUTO-ENTMCNC: 33.9 G/DL (ref 31–36)
MCV RBC AUTO: 89.7 FL (ref 80–100)
MONOCYTES ABSOLUTE: 0.5 K/UL (ref 0–1.3)
MONOCYTES RELATIVE PERCENT: 11.8 %
NEUTROPHILS ABSOLUTE: 2.3 K/UL (ref 1.7–7.7)
NEUTROPHILS RELATIVE PERCENT: 48.7 %
PDW BLD-RTO: 13 % (ref 12.4–15.4)
PHOSPHORUS: 4 MG/DL (ref 2.5–4.9)
PLATELET # BLD: 318 K/UL (ref 135–450)
PMV BLD AUTO: 8.8 FL (ref 5–10.5)
POTASSIUM REFLEX MAGNESIUM: 3.7 MMOL/L (ref 3.5–5.1)
POTASSIUM SERPL-SCNC: 3.7 MMOL/L (ref 3.5–5.1)
RBC # BLD: 4.73 M/UL (ref 4–5.2)
SODIUM BLD-SCNC: 136 MMOL/L (ref 136–145)
WBC # BLD: 4.7 K/UL (ref 4–11)

## 2019-12-30 PROCEDURE — 7100000001 HC PACU RECOVERY - ADDTL 15 MIN: Performed by: INTERNAL MEDICINE

## 2019-12-30 PROCEDURE — 83735 ASSAY OF MAGNESIUM: CPT

## 2019-12-30 PROCEDURE — 2580000003 HC RX 258: Performed by: INTERNAL MEDICINE

## 2019-12-30 PROCEDURE — 80069 RENAL FUNCTION PANEL: CPT

## 2019-12-30 PROCEDURE — 3700000001 HC ADD 15 MINUTES (ANESTHESIA): Performed by: INTERNAL MEDICINE

## 2019-12-30 PROCEDURE — 84703 CHORIONIC GONADOTROPIN ASSAY: CPT

## 2019-12-30 PROCEDURE — 3609017100 HC EGD: Performed by: INTERNAL MEDICINE

## 2019-12-30 PROCEDURE — 6370000000 HC RX 637 (ALT 250 FOR IP): Performed by: HOSPITALIST

## 2019-12-30 PROCEDURE — 7100000000 HC PACU RECOVERY - FIRST 15 MIN: Performed by: INTERNAL MEDICINE

## 2019-12-30 PROCEDURE — 3700000000 HC ANESTHESIA ATTENDED CARE: Performed by: INTERNAL MEDICINE

## 2019-12-30 PROCEDURE — 2500000003 HC RX 250 WO HCPCS: Performed by: NURSE ANESTHETIST, CERTIFIED REGISTERED

## 2019-12-30 PROCEDURE — 85025 COMPLETE CBC W/AUTO DIFF WBC: CPT

## 2019-12-30 PROCEDURE — 6370000000 HC RX 637 (ALT 250 FOR IP): Performed by: INTERNAL MEDICINE

## 2019-12-30 PROCEDURE — 2580000003 HC RX 258: Performed by: NURSE ANESTHETIST, CERTIFIED REGISTERED

## 2019-12-30 PROCEDURE — 6360000002 HC RX W HCPCS: Performed by: INTERNAL MEDICINE

## 2019-12-30 PROCEDURE — 0DJ08ZZ INSPECTION OF UPPER INTESTINAL TRACT, VIA NATURAL OR ARTIFICIAL OPENING ENDOSCOPIC: ICD-10-PCS | Performed by: INTERNAL MEDICINE

## 2019-12-30 PROCEDURE — 93010 ELECTROCARDIOGRAM REPORT: CPT | Performed by: INTERNAL MEDICINE

## 2019-12-30 PROCEDURE — 36415 COLL VENOUS BLD VENIPUNCTURE: CPT

## 2019-12-30 PROCEDURE — G0378 HOSPITAL OBSERVATION PER HR: HCPCS

## 2019-12-30 PROCEDURE — 6360000002 HC RX W HCPCS: Performed by: NURSE ANESTHETIST, CERTIFIED REGISTERED

## 2019-12-30 PROCEDURE — 2709999900 HC NON-CHARGEABLE SUPPLY: Performed by: INTERNAL MEDICINE

## 2019-12-30 RX ORDER — PROPOFOL 10 MG/ML
INJECTION, EMULSION INTRAVENOUS PRN
Status: DISCONTINUED | OUTPATIENT
Start: 2019-12-30 | End: 2019-12-30 | Stop reason: SDUPTHER

## 2019-12-30 RX ORDER — OSELTAMIVIR PHOSPHATE 75 MG/1
75 CAPSULE ORAL 2 TIMES DAILY
Qty: 8 CAPSULE | Refills: 0 | Status: SHIPPED | OUTPATIENT
Start: 2019-12-30 | End: 2020-01-03

## 2019-12-30 RX ORDER — SODIUM CHLORIDE 9 MG/ML
INJECTION, SOLUTION INTRAVENOUS CONTINUOUS PRN
Status: DISCONTINUED | OUTPATIENT
Start: 2019-12-30 | End: 2019-12-30 | Stop reason: SDUPTHER

## 2019-12-30 RX ORDER — PANTOPRAZOLE SODIUM 40 MG/1
40 TABLET, DELAYED RELEASE ORAL DAILY
Qty: 30 TABLET | Refills: 3 | Status: SHIPPED | OUTPATIENT
Start: 2019-12-30 | End: 2020-12-15

## 2019-12-30 RX ORDER — LIDOCAINE HYDROCHLORIDE 20 MG/ML
INJECTION, SOLUTION EPIDURAL; INFILTRATION; INTRACAUDAL; PERINEURAL PRN
Status: DISCONTINUED | OUTPATIENT
Start: 2019-12-30 | End: 2019-12-30 | Stop reason: SDUPTHER

## 2019-12-30 RX ORDER — HYDROCHLOROTHIAZIDE 25 MG/1
25 TABLET ORAL DAILY
Qty: 30 TABLET | Refills: 3 | Status: SHIPPED | OUTPATIENT
Start: 2019-12-31 | End: 2019-12-30

## 2019-12-30 RX ORDER — HYDROCHLOROTHIAZIDE 25 MG/1
25 TABLET ORAL DAILY
Qty: 30 TABLET | Refills: 3 | Status: SHIPPED | OUTPATIENT
Start: 2019-12-31 | End: 2020-12-15

## 2019-12-30 RX ADMIN — Medication 10 ML: at 06:41

## 2019-12-30 RX ADMIN — ONDANSETRON 4 MG: 2 INJECTION INTRAMUSCULAR; INTRAVENOUS at 06:40

## 2019-12-30 RX ADMIN — SODIUM CHLORIDE: 9 INJECTION, SOLUTION INTRAVENOUS at 15:35

## 2019-12-30 RX ADMIN — PANTOPRAZOLE SODIUM 40 MG: 40 TABLET, DELAYED RELEASE ORAL at 16:41

## 2019-12-30 RX ADMIN — PROPOFOL 100 MG: 10 INJECTION, EMULSION INTRAVENOUS at 15:45

## 2019-12-30 RX ADMIN — SODIUM CHLORIDE, PRESERVATIVE FREE 10 ML: 5 INJECTION INTRAVENOUS at 08:29

## 2019-12-30 RX ADMIN — PROPOFOL 50 MG: 10 INJECTION, EMULSION INTRAVENOUS at 15:51

## 2019-12-30 RX ADMIN — HYDROCHLOROTHIAZIDE 25 MG: 25 TABLET ORAL at 08:10

## 2019-12-30 RX ADMIN — PROPOFOL 50 MG: 10 INJECTION, EMULSION INTRAVENOUS at 15:54

## 2019-12-30 RX ADMIN — PROPOFOL 100 MG: 10 INJECTION, EMULSION INTRAVENOUS at 15:48

## 2019-12-30 RX ADMIN — LIDOCAINE HYDROCHLORIDE 100 MG: 20 INJECTION, SOLUTION EPIDURAL; INFILTRATION; INTRACAUDAL; PERINEURAL at 15:45

## 2019-12-30 RX ADMIN — TRAMADOL HYDROCHLORIDE 50 MG: 50 TABLET, FILM COATED ORAL at 13:36

## 2019-12-30 RX ADMIN — PANTOPRAZOLE SODIUM 40 MG: 40 TABLET, DELAYED RELEASE ORAL at 05:10

## 2019-12-30 RX ADMIN — ONDANSETRON 4 MG: 2 INJECTION INTRAMUSCULAR; INTRAVENOUS at 12:18

## 2019-12-30 RX ADMIN — OSELTAMIVIR PHOSPHATE 75 MG: 75 CAPSULE ORAL at 08:10

## 2019-12-30 RX ADMIN — TRAMADOL HYDROCHLORIDE 50 MG: 50 TABLET, FILM COATED ORAL at 05:11

## 2019-12-30 ASSESSMENT — LIFESTYLE VARIABLES: SMOKING_STATUS: 0

## 2019-12-30 ASSESSMENT — PAIN SCALES - GENERAL
PAINLEVEL_OUTOF10: 4
PAINLEVEL_OUTOF10: 0
PAINLEVEL_OUTOF10: 7
PAINLEVEL_OUTOF10: 0
PAINLEVEL_OUTOF10: 0

## 2019-12-30 ASSESSMENT — PULMONARY FUNCTION TESTS
PIF_VALUE: 0

## 2019-12-30 ASSESSMENT — PAIN DESCRIPTION - PAIN TYPE
TYPE: ACUTE PAIN
TYPE: ACUTE PAIN

## 2019-12-30 ASSESSMENT — PAIN DESCRIPTION - DESCRIPTORS
DESCRIPTORS: ACHING
DESCRIPTORS: ACHING

## 2019-12-30 ASSESSMENT — PAIN - FUNCTIONAL ASSESSMENT
PAIN_FUNCTIONAL_ASSESSMENT: 0-10
PAIN_FUNCTIONAL_ASSESSMENT: ACTIVITIES ARE NOT PREVENTED

## 2019-12-30 ASSESSMENT — PAIN DESCRIPTION - LOCATION
LOCATION: HEAD
LOCATION: CHEST

## 2019-12-30 ASSESSMENT — PAIN DESCRIPTION - FREQUENCY: FREQUENCY: CONTINUOUS

## 2019-12-30 NOTE — DISCHARGE SUMMARY
acute exacerbation  Continue with home meds        Morbid obesity  Body mass index is 45.96 kg/m². Complicating her medical problems  Weight loss counseling was provided    Discharge Medications:   Current Discharge Medication List      START taking these medications    Details   oseltamivir (TAMIFLU) 75 MG capsule Take 1 capsule by mouth 2 times daily for 4 days  Qty: 8 capsule, Refills: 0      hydrochlorothiazide (HYDRODIURIL) 25 MG tablet Take 1 tablet by mouth daily  Qty: 30 tablet, Refills: 3      pantoprazole (PROTONIX) 40 MG tablet Take 1 tablet by mouth daily  Qty: 30 tablet, Refills: 3           Current Discharge Medication List        Current Discharge Medication List      CONTINUE these medications which have NOT CHANGED    Details   budesonide-formoterol (SYMBICORT) 160-4.5 MCG/ACT AERO Inhale 2 puffs into the lungs 2 times daily      albuterol sulfate  (90 BASE) MCG/ACT inhaler Inhale 2 puffs into the lungs every 6 hours as needed for Wheezing  Qty: 1 Inhaler, Refills: 3           Current Discharge Medication List      STOP taking these medications       diphenhydrAMINE (BENADRYL) 25 MG capsule Comments:   Reason for Stopping:         ibuprofen (ADVIL;MOTRIN) 800 MG tablet Comments:   Reason for Stopping:         omeprazole (PRILOSEC) 20 MG delayed release capsule Comments:   Reason for Stopping:                   Procedures:     Assessment on Discharge: Stable, improved     Discharge ROS:  A complete review of systems was asked and negative. Discharge Exam:  BP (!) 141/97   Pulse 89   Temp 98.7 °F (37.1 °C) (Oral)   Resp 16   Ht 5' 10\" (1.778 m)   Wt (!) 320 lb 5.3 oz (145.3 kg)   SpO2 96%   BMI 45.96 kg/m²     Gen: NAD  HEENT: NC/AT, moist mucous membranes, no oropharyngeal erythema or exudate  Neck: supple, trachea midline, no anterior cervical or SC LAD  Heart:  Normal s1/s2, RRR, no murmurs, gallops, or rubs.  no leg edema  Lungs:  CTA bilaterally, no wheeze,no rales or rhonchi, no use of accessory muscles  Abd: bowel sounds present, soft, nontender, nondistended, no masses  Extrem:  No clubbing, cyanosis,  no edema  Skin: no lesion or masses  Psych:  A & O x3  Neuro: grossly intact, moves all four extremities    Pertinent Studies During Hospital Stay:  Radiology:  Xr Chest Standard (2 Vw)    Result Date: 12/27/2019  EXAMINATION: TWO XRAY VIEWS OF THE CHEST 12/27/2019 1:13 am COMPARISON: 11/16/2019 HISTORY: ORDERING SYSTEM PROVIDED HISTORY: Chest Pain TECHNOLOGIST PROVIDED HISTORY: Reason for exam:->Chest Pain Reason for Exam: Chest Pain, voimiting blood Acuity: Acute Type of Exam: Initial FINDINGS: Normal heart size and pulmonary vasculature. No focal consolidations, pleural effusions, or pneumothorax. Unremarkable. Ct Abdomen Pelvis W Iv Contrast Additional Contrast? None    Result Date: 12/27/2019  EXAMINATION: CTA OF THE CHEST; CT OF THE ABDOMEN AND PELVIS WITH CONTRAST 12/27/2019 5:51 am TECHNIQUE: CTA of the chest was performed after the administration of intravenous contrast.  Multiplanar reformatted images are provided for review. MIP images are provided for review. Dose modulation, iterative reconstruction, and/or weight based adjustment of the mA/kV was utilized to reduce the radiation dose to as low as reasonably achievable.; CT of the abdomen and pelvis was performed with the administration of intravenous contrast. Multiplanar reformatted images are provided for review. Dose modulation, iterative reconstruction, and/or weight based adjustment of the mA/kV was utilized to reduce the radiation dose to as low as reasonably achievable.  COMPARISON: 04/04/2019 and 08/27/2018 HISTORY: ORDERING SYSTEM PROVIDED HISTORY: chest pain TECHNOLOGIST PROVIDED HISTORY: Reason for exam:->chest pain Reason for Exam: chest pain Acuity: Acute Relevant Medical/Surgical History: hx asthma, pe; ORDERING SYSTEM PROVIDED HISTORY: abdominal pain TECHNOLOGIST PROVIDED HISTORY: Reason for exam:->abdominal pain Additional Contrast?->None Reason for Exam: abdominal pain Acuity: Acute Type of Exam: Initial Relevant Medical/Surgical History: Cholecystectomy,  section FINDINGS: Chest: Pulmonary Arteries: There is no acute pulmonary thromboembolus. Mediastinum: The heart is unremarkable. There are no enlarged thoracic lymph nodes. Residual thymus tissue is present anterior mediastinum. Lungs/pleura: The airway is patent. There is no pneumothorax or pleural effusion. There is bibasilar atelectasis. No change in the multiple 2-3 mm solid bilateral pulmonary nodules, no follow-up imaging is recommended. Soft Tissues/Bones: The osseous structures are unremarkable. Abdomen/Pelvis: Organs: The liver, spleen, pancreas, adrenal glands and kidneys are unremarkable. Status post cholecystectomy with unchanged mild intrahepatic ductal dilatation. GI/Bowel: There is no bowel obstruction. A small hiatal hernia is noted. The appendix is within normal limits. Pelvis: The pelvic viscera are within normal limits. An intrauterine device in situ. No change in the 1.4 x 2.1 cm cystic lesion within the right vaginal wall likely due to a bar than gland cyst. Peritoneum/Retroperitoneum: There is no free fluid or adenopathy. Bones/Soft Tissues: Degenerative changes involve the lumbar spine. 1. No acute abnormality. Ct Chest Pulmonary Embolism W Contrast    Result Date: 2019  EXAMINATION: CTA OF THE CHEST; CT OF THE ABDOMEN AND PELVIS WITH CONTRAST 2019 5:51 am TECHNIQUE: CTA of the chest was performed after the administration of intravenous contrast.  Multiplanar reformatted images are provided for review. MIP images are provided for review.  Dose modulation, iterative reconstruction, and/or weight based adjustment of the mA/kV was utilized to reduce the radiation dose to as low as reasonably achievable.; CT of the abdomen and pelvis was performed with the administration of intravenous or concerns please feel free to contact me at [phone].     Electronically signed by Iza Solis MD on 12/30/2019 at 5:08 PM

## 2019-12-30 NOTE — PROGRESS NOTES
[facility]    EGD -   3 cm hiatal hernia  Small apparent healing Amaris-Ga tear at the GE junction  No active bleeding    REC:  OK for discharge on 40 mg pantoprazole qd  No further evaluation is required  She can follow-up with me in the office as needed

## 2019-12-30 NOTE — CONSULTS
[address] [facility]                                  CONSULTATION    PATIENT NAME: Adenike Richardson                      :        1985  MED REC NO:   [id number]                          ROOM:       4115  ACCOUNT NO:   [de-identified]                           ADMIT DATE: 2019  PROVIDER:     Liliam Manning MD    CONSULT DATE:  2019    REFERRING PROVIDERS:  Keyur Lomeli MD and Thuy Burgess MD    HISTORY OF PRESENT ILLNESS:  The patient is a 48-year-old black female  admitted through the emergency room on 2019 with several episodes  of gross hematemesis. Unfortunately, I was not contacted about the  consultation until last evening. Fortunately, the patient has remained  hemodynamically stable and has not experienced a significant drop in the  hemoglobin level. She just had a small amount of hematemesis earlier  this morning. She has had some epigastric pain. The patient does take  ibuprofen three to four times per week. She denied use of aspirin,  tobacco or significant amounts of ethanol. PAST MEDICAL HISTORY:  This is remarkable for hypertension associated  with pregnancy and preeclampsia, asthma, and history of pulmonary  embolism. PAST SURGICAL HISTORY:  The patient has had a previous laparoscopic  cholecystectomy and C-sections. MEDICATIONS:  Medications at home had included an albuterol inhaler, 40  mg of omeprazole, Benadryl p.r.n., and the ibuprofen. ALLERGIES:  SHELLFISH were described. FAMILY HISTORY:  Noncontributory. SOCIAL HISTORY:  Habits as described above. PHYSICAL EXAMINATION:  VITAL SIGNS:  Blood pressure is elevated at 168/106. Pulse 80. Temperature 98.1 degrees orally. GENERAL:  The patient appeared as an alert and cooperative young black  female in no acute distress. ABDOMEN:  Obese but soft. There was localized epigastric tenderness. I  palpated no masses or organomegaly. IMPRESSION:  Recurrent hematemesis in this young female who uses  ibuprofen chronically. The concern would be peptic ulcer disease versus  a Amaris-Ga tear. PLAN:  The patient will be kept hospitalized overnight. She will be  switched to pantoprazole by mouth. An EGD will be scheduled for  tomorrow.         Valorie eWinberg MD    D: 12/29/2019 14:07:00       T: 12/29/2019 17:06:07     [email or web address]  Job#: [id number]     Doc#: [id number]    CC:  MD Elaine Wilkinson MD Karoline Civatte, MD

## 2019-12-31 NOTE — OP NOTE
[address] [facility]                                OPERATIVE REPORT    PATIENT NAME: Kimi Ramirez                      :        1985  MED REC NO:   [id number]                          ROOM:       4115  ACCOUNT NO:   [de-identified]                           ADMIT DATE: 2019  PROVIDER:     Nicol Barrera MD    DATE OF PROCEDURE:  2019    REFERRING PROVIDERS:  Brad Fisher MD and Donato Brumfield MD    PATIENT HISTORY:  A 65-year-old female, room #4614. INSTRUMENT USED:  Olympus GIF-Q180. SURGEON:  Nicol Barrera MD    ANESTHESIA:  The patient was premedicated with Diprivan intravenously as  administered by the anesthesiology service. INDICATIONS:  The patient has presented with hematemesis. PROCEDURE:  The endoscope was inserted into the esophagus without  difficulty. The esophageal mucosa was entirely normal, revealing no  evidence of inflammatory or metaplastic change. The Z-line was located  at 36 cm, above a 3-cm hiatal hernia. There was a small apparent  healing Amaris-Ga tear at the gastroesophageal junction. There was  no active bleeding and no evidence of any risk of rebleeding. The  stomach, duodenal bulb, and descending duodenum were all normal.    ESTIMATED BLOOD LOSS:  None. IMPRESSION:  1. A small apparent healing Amaris-Ga tear as the cause for the  patient's recent bleeding. 2.  A 3-cm hiatal hernia. PLAN:  The patient can be discharged. I will treat her with 40 mg of  pantoprazole daily for the next four to six weeks. She can follow up  with me in the office as needed.         Delia Ochoa MD    D: 2019 16:49:34       T: 2019 0:36:27     [email or web address]  Job#: [id number]     Doc#: [id number]    CC:  MD Brad Valdez MD Alfredo Feeler, MD

## 2020-01-03 VITALS
WEIGHT: 293 LBS | TEMPERATURE: 98.7 F | BODY MASS INDEX: 41.95 KG/M2 | HEIGHT: 70 IN | OXYGEN SATURATION: 96 % | RESPIRATION RATE: 16 BRPM | SYSTOLIC BLOOD PRESSURE: 141 MMHG | HEART RATE: 89 BPM | DIASTOLIC BLOOD PRESSURE: 97 MMHG

## 2020-01-08 ENCOUNTER — APPOINTMENT (OUTPATIENT)
Dept: GENERAL RADIOLOGY | Age: 35
End: 2020-01-08
Payer: COMMERCIAL

## 2020-01-08 ENCOUNTER — APPOINTMENT (OUTPATIENT)
Dept: CT IMAGING | Age: 35
End: 2020-01-08
Payer: COMMERCIAL

## 2020-01-08 ENCOUNTER — HOSPITAL ENCOUNTER (EMERGENCY)
Age: 35
Discharge: HOME | End: 2020-01-08
Attending: EMERGENCY MEDICINE
Payer: COMMERCIAL

## 2020-01-08 VITALS
HEIGHT: 70 IN | DIASTOLIC BLOOD PRESSURE: 85 MMHG | BODY MASS INDEX: 41.95 KG/M2 | OXYGEN SATURATION: 100 % | WEIGHT: 293 LBS | SYSTOLIC BLOOD PRESSURE: 144 MMHG | HEART RATE: 85 BPM | TEMPERATURE: 98.3 F | RESPIRATION RATE: 18 BRPM

## 2020-01-08 LAB
ANION GAP SERPL CALCULATED.3IONS-SCNC: 14 MMOL/L (ref 3–16)
BASOPHILS ABSOLUTE: 0.1 K/UL (ref 0–0.2)
BASOPHILS RELATIVE PERCENT: 1.1 %
BUN BLDV-MCNC: 8 MG/DL (ref 7–20)
CALCIUM SERPL-MCNC: 9.1 MG/DL (ref 8.3–10.6)
CHLORIDE BLD-SCNC: 101 MMOL/L (ref 99–110)
CO2: 20 MMOL/L (ref 21–32)
CREAT SERPL-MCNC: 0.7 MG/DL (ref 0.6–1.1)
D DIMER: 271 NG/ML DDU (ref 0–229)
EKG ATRIAL RATE: 81 BPM
EKG ATRIAL RATE: 84 BPM
EKG DIAGNOSIS: NORMAL
EKG DIAGNOSIS: NORMAL
EKG P AXIS: 48 DEGREES
EKG P AXIS: 50 DEGREES
EKG P-R INTERVAL: 144 MS
EKG P-R INTERVAL: 172 MS
EKG Q-T INTERVAL: 350 MS
EKG Q-T INTERVAL: 368 MS
EKG QRS DURATION: 70 MS
EKG QRS DURATION: 88 MS
EKG QTC CALCULATION (BAZETT): 413 MS
EKG QTC CALCULATION (BAZETT): 427 MS
EKG R AXIS: 61 DEGREES
EKG R AXIS: 62 DEGREES
EKG T AXIS: 23 DEGREES
EKG T AXIS: 51 DEGREES
EKG VENTRICULAR RATE: 81 BPM
EKG VENTRICULAR RATE: 84 BPM
EOSINOPHILS ABSOLUTE: 0.1 K/UL (ref 0–0.6)
EOSINOPHILS RELATIVE PERCENT: 2.3 %
GFR AFRICAN AMERICAN: >60
GFR NON-AFRICAN AMERICAN: >60
GLUCOSE BLD-MCNC: 102 MG/DL (ref 70–99)
HCG QUALITATIVE: NEGATIVE
HCT VFR BLD CALC: 38.9 % (ref 36–48)
HEMOGLOBIN: 13.1 G/DL (ref 12–16)
LYMPHOCYTES ABSOLUTE: 1.6 K/UL (ref 1–5.1)
LYMPHOCYTES RELATIVE PERCENT: 26.9 %
MCH RBC QN AUTO: 30.1 PG (ref 26–34)
MCHC RBC AUTO-ENTMCNC: 33.7 G/DL (ref 31–36)
MCV RBC AUTO: 89.3 FL (ref 80–100)
MONOCYTES ABSOLUTE: 0.4 K/UL (ref 0–1.3)
MONOCYTES RELATIVE PERCENT: 7.4 %
NEUTROPHILS ABSOLUTE: 3.6 K/UL (ref 1.7–7.7)
NEUTROPHILS RELATIVE PERCENT: 62.3 %
PDW BLD-RTO: 13.2 % (ref 12.4–15.4)
PLATELET # BLD: 299 K/UL (ref 135–450)
PMV BLD AUTO: 8.9 FL (ref 5–10.5)
POTASSIUM REFLEX MAGNESIUM: 3.6 MMOL/L (ref 3.5–5.1)
PRO-BNP: 28 PG/ML (ref 0–124)
RBC # BLD: 4.35 M/UL (ref 4–5.2)
SODIUM BLD-SCNC: 135 MMOL/L (ref 136–145)
TROPONIN: <0.01 NG/ML
TROPONIN: <0.01 NG/ML
WBC # BLD: 5.8 K/UL (ref 4–11)

## 2020-01-08 PROCEDURE — 93010 ELECTROCARDIOGRAM REPORT: CPT | Performed by: INTERNAL MEDICINE

## 2020-01-08 PROCEDURE — 96374 THER/PROPH/DIAG INJ IV PUSH: CPT

## 2020-01-08 PROCEDURE — 80048 BASIC METABOLIC PNL TOTAL CA: CPT

## 2020-01-08 PROCEDURE — 85025 COMPLETE CBC W/AUTO DIFF WBC: CPT

## 2020-01-08 PROCEDURE — 6360000004 HC RX CONTRAST MEDICATION: Performed by: EMERGENCY MEDICINE

## 2020-01-08 PROCEDURE — 84484 ASSAY OF TROPONIN QUANT: CPT

## 2020-01-08 PROCEDURE — 84703 CHORIONIC GONADOTROPIN ASSAY: CPT

## 2020-01-08 PROCEDURE — 71046 X-RAY EXAM CHEST 2 VIEWS: CPT

## 2020-01-08 PROCEDURE — 85379 FIBRIN DEGRADATION QUANT: CPT

## 2020-01-08 PROCEDURE — 99285 EMERGENCY DEPT VISIT HI MDM: CPT

## 2020-01-08 PROCEDURE — 6370000000 HC RX 637 (ALT 250 FOR IP): Performed by: EMERGENCY MEDICINE

## 2020-01-08 PROCEDURE — 93005 ELECTROCARDIOGRAM TRACING: CPT | Performed by: EMERGENCY MEDICINE

## 2020-01-08 PROCEDURE — 83880 ASSAY OF NATRIURETIC PEPTIDE: CPT

## 2020-01-08 PROCEDURE — 71260 CT THORAX DX C+: CPT

## 2020-01-08 PROCEDURE — 6360000002 HC RX W HCPCS: Performed by: EMERGENCY MEDICINE

## 2020-01-08 RX ORDER — FAMOTIDINE 20 MG/1
20 TABLET, FILM COATED ORAL 2 TIMES DAILY
Qty: 60 TABLET | Refills: 0 | Status: SHIPPED | OUTPATIENT
Start: 2020-01-08 | End: 2020-12-15

## 2020-01-08 RX ORDER — LIDOCAINE HYDROCHLORIDE 20 MG/ML
15 SOLUTION OROPHARYNGEAL ONCE
Status: COMPLETED | OUTPATIENT
Start: 2020-01-08 | End: 2020-01-08

## 2020-01-08 RX ORDER — MORPHINE SULFATE 4 MG/ML
4 INJECTION, SOLUTION INTRAMUSCULAR; INTRAVENOUS ONCE
Status: COMPLETED | OUTPATIENT
Start: 2020-01-08 | End: 2020-01-08

## 2020-01-08 RX ORDER — ONDANSETRON 4 MG/1
4 TABLET, ORALLY DISINTEGRATING ORAL EVERY 8 HOURS PRN
Qty: 20 TABLET | Refills: 0 | Status: SHIPPED | OUTPATIENT
Start: 2020-01-08 | End: 2020-12-15

## 2020-01-08 RX ADMIN — IOPAMIDOL 75 ML: 755 INJECTION, SOLUTION INTRAVENOUS at 01:53

## 2020-01-08 RX ADMIN — MORPHINE SULFATE 4 MG: 4 INJECTION, SOLUTION INTRAMUSCULAR; INTRAVENOUS at 01:01

## 2020-01-08 RX ADMIN — LIDOCAINE HYDROCHLORIDE 15 ML: 20 SOLUTION ORAL; TOPICAL at 01:27

## 2020-01-08 RX ADMIN — MORPHINE SULFATE 4 MG: 4 INJECTION, SOLUTION INTRAMUSCULAR; INTRAVENOUS at 01:27

## 2020-01-08 ASSESSMENT — PAIN DESCRIPTION - LOCATION: LOCATION: CHEST

## 2020-01-08 ASSESSMENT — PAIN DESCRIPTION - ONSET: ONSET: ON-GOING

## 2020-01-08 ASSESSMENT — PAIN - FUNCTIONAL ASSESSMENT: PAIN_FUNCTIONAL_ASSESSMENT: PREVENTS OR INTERFERES SOME ACTIVE ACTIVITIES AND ADLS

## 2020-01-08 ASSESSMENT — PAIN SCALES - GENERAL
PAINLEVEL_OUTOF10: 8
PAINLEVEL_OUTOF10: 9
PAINLEVEL_OUTOF10: 9
PAINLEVEL_OUTOF10: 3

## 2020-01-08 ASSESSMENT — PAIN DESCRIPTION - DESCRIPTORS: DESCRIPTORS: ACHING

## 2020-01-08 ASSESSMENT — PAIN DESCRIPTION - FREQUENCY
FREQUENCY: CONTINUOUS
FREQUENCY: CONTINUOUS

## 2020-01-08 ASSESSMENT — PAIN DESCRIPTION - PAIN TYPE: TYPE: ACUTE PAIN

## 2020-01-08 ASSESSMENT — PAIN DESCRIPTION - PROGRESSION: CLINICAL_PROGRESSION: NOT CHANGED

## 2020-01-08 ASSESSMENT — PAIN DESCRIPTION - ORIENTATION: ORIENTATION: MID

## 2020-01-08 NOTE — ED TRIAGE NOTES
Patient admitted to ED via private car with complaints of chest pain from when she woke up this morning. Patient states that the chest pain feels like stabbing and that she also has shortness of breath. Patient has history of PE and hypertension. Blood pressure is slightly elevated. Other VS are WNL. Patient is alert and oriented x4.

## 2020-01-08 NOTE — ED NOTES
Patient resting comfortably, respirations easy, unlabored. Patient in no acute distress. Denies needs at this time. Call light within reach, bed in lowest position, side rails up x 2.         Jonah Albert RN  01/08/20 1664

## 2020-01-09 ASSESSMENT — ENCOUNTER SYMPTOMS
NAUSEA: 0
CHOKING: 0
SORE THROAT: 0
COUGH: 0
VOMITING: 0
WHEEZING: 0
CHEST TIGHTNESS: 1
SHORTNESS OF BREATH: 1
ABDOMINAL PAIN: 0

## 2020-01-10 NOTE — ED PROVIDER NOTES
pregnancy    Obesity     Pre-eclampsia     Pulmonary emboli (HCC)          SURGICALHISTORY       Past Surgical History:   Procedure Laterality Date     SECTION      x 3    CHOLECYSTECTOMY, LAPAROSCOPIC  2018     Laparoscopic cholecystectomy with cholangiogram    UPPER GASTROINTESTINAL ENDOSCOPY N/A 2019    EGD DIAGNOSTIC ONLY performed by Gwen Mccann MD at [address] [place] [address]       Discharge Medication List as of 2020  4:53 AM      CONTINUE these medications which have NOT CHANGED    Details   hydrochlorothiazide (HYDRODIURIL) 25 MG tablet Take 1 tablet by mouth daily, Disp-30 tablet, R-3Normal      pantoprazole (PROTONIX) 40 MG tablet Take 1 tablet by mouth daily, Disp-30 tablet, R-3Normal      budesonide-formoterol (SYMBICORT) 160-4.5 MCG/ACT AERO Inhale 2 puffs into the lungs 2 times dailyHistorical Med      albuterol sulfate  (90 BASE) MCG/ACT inhaler Inhale 2 puffs into the lungs every 6 hours as needed for Wheezing, Disp-1 Inhaler, R-3             ALLERGIES     Shellfish-derived products    FAMILY HISTORY       Family History   Problem Relation Age of Onset    High Blood Pressure Father     Diabetes Paternal Grandmother     High Blood Pressure Paternal Grandmother     Diabetes Paternal Grandfather     High Blood Pressure Paternal Grandfather     Asthma Mother     Cancer Maternal Grandmother           SOCIAL HISTORY       Social History     Socioeconomic History    Marital status:      Spouse name: None    Number of children: None    Years of education: None    Highest education level: None   Occupational History    None   Social Needs    Financial resource strain: None    Food insecurity:     Worry: None     Inability: None    Transportation needs:     Medical: None     Non-medical: None   Tobacco Use    Smoking status: Never Smoker    Smokeless tobacco: Never Used   Substance and Sexual All EKG's are interpreted by the Emergency Department Physician who either signs or Co-signsthis chart in the absence of a cardiologist.    Patient's EKG shows sinus rhythm with a ventricular rate of 84 bpm.  GA interval and QTc interval within excitable range. Patient has a normal axis. There are no significant ST elevations or depressions EKG is nondiagnostic for ACS. Compared to EKG from 12/27/2019 there are does have acute changes. RADIOLOGY:   Non-plain filmimages such as CT, Ultrasound and MRI are read by the radiologist. Plain radiographic images are visualized and preliminarily interpreted by the emergency physician with the below findings:      Interpretation per the Radiologist below, if available at the time ofthis note:    CT Chest Pulmonary Embolism W Contrast   Final Result   No evidence of pulmonary embolism or acute pulmonary abnormality. XR CHEST STANDARD (2 VW)   Final Result   No acute abnormality identified.                ED BEDSIDE ULTRASOUND:   Performed by ED Physician - none    LABS:  Labs Reviewed   BASIC METABOLIC PANEL W/ REFLEX TO MG FOR LOW K - Abnormal; Notable for the following components:       Result Value    Sodium 135 (*)     CO2 20 (*)     Glucose 102 (*)     All other components within normal limits    Narrative:     Performed at:  [facility]  [address] [place], [place] [address]   Phone [phone]   D-DIMER, QUANTITATIVE - Abnormal; Notable for the following components:    D-Dimer, Quant 271 (*)     All other components within normal limits    Narrative:     Performed at:  [facility]  [address] [place], [place] [address]   Phone [phone]   HCG, SERUM, QUALITATIVE    Narrative:     Performed at:  [facility]  [address] [place], [place] [address]   Phone [phone]   CBC WITH AUTO DIFFERENTIAL    Narrative:     Performed at:  [facility] ([facility]) was slightly elevated and CT PE study was negative. Patient did have repeat troponins performed which were both negative. As the patient symptoms are improving and she is had 2- troponins and CT scan is negative for cardiopulmonary disease only she is here for discharge home. Results were discussed with the patient and why it was of the opinion that the patient was suitable for discharge. Patient is amenable to discharge home. Return indications discussed with the patient. Patient demonstrates understanding of when to return for reevaluation for persistent or worsening symptoms. CRITICAL CARE TIME   Total Critical Care time was 10 minutes, excluding separatelyreportable procedures. There was a high probability ofclinically significant/life threatening deterioration in the patient's condition which required my urgent intervention. CONSULTS:  None    PROCEDURES:  Unless otherwise noted below, none     Procedures    FINAL IMPRESSION      1.  Chest pain, unspecified type          DISPOSITION/PLAN   DISPOSITION Decision To Discharge 01/08/2020 04:34:22 AM      PATIENT REFERREDTO:  Karina Cotton MD  [address] [place]  [facility] [address]  [phone]    Schedule an appointment as soon as possible for a visit   As needed      DISCHARGEMEDICATIONS:  Discharge Medication List as of 1/8/2020  4:53 AM      START taking these medications    Details   famotidine (PEPCID) 20 MG tablet Take 1 tablet by mouth 2 times daily, Disp-60 tablet, R-0Print      ondansetron (ZOFRAN ODT) 4 MG disintegrating tablet Take 1 tablet by mouth every 8 hours as needed for Nausea, Disp-20 tablet, R-0Print                (Please note that portions of this note were completed with a voice recognition program.  Efforts were made to edit the dictations but occasionally words are mis-transcribed.)    Anastasiia Francois MD (electronically signed)  Attending Emergency Physician          Anastasiia Francois MD  01/09/20 7738

## 2020-02-14 ENCOUNTER — APPOINTMENT (OUTPATIENT)
Dept: GENERAL RADIOLOGY | Age: 35
End: 2020-02-14
Payer: COMMERCIAL

## 2020-02-14 ENCOUNTER — HOSPITAL ENCOUNTER (EMERGENCY)
Age: 35
Discharge: HOME | End: 2020-02-14
Payer: COMMERCIAL

## 2020-02-14 VITALS
HEIGHT: 70 IN | DIASTOLIC BLOOD PRESSURE: 96 MMHG | WEIGHT: 293 LBS | HEART RATE: 80 BPM | OXYGEN SATURATION: 100 % | TEMPERATURE: 97.9 F | SYSTOLIC BLOOD PRESSURE: 153 MMHG | RESPIRATION RATE: 17 BRPM | BODY MASS INDEX: 41.95 KG/M2

## 2020-02-14 PROCEDURE — 99284 EMERGENCY DEPT VISIT MOD MDM: CPT

## 2020-02-14 PROCEDURE — 73610 X-RAY EXAM OF ANKLE: CPT

## 2020-02-14 RX ORDER — ACETAMINOPHEN 325 MG/1
650 TABLET ORAL ONCE
Status: DISCONTINUED | OUTPATIENT
Start: 2020-02-14 | End: 2020-02-14 | Stop reason: HOSPADM

## 2020-02-14 RX ORDER — IBUPROFEN 400 MG/1
800 TABLET ORAL ONCE
Status: DISCONTINUED | OUTPATIENT
Start: 2020-02-14 | End: 2020-02-14 | Stop reason: HOSPADM

## 2020-02-14 RX ORDER — TRAMADOL HYDROCHLORIDE 50 MG/1
50 TABLET ORAL EVERY 6 HOURS PRN
Qty: 8 TABLET | Refills: 0 | Status: SHIPPED | OUTPATIENT
Start: 2020-02-14 | End: 2020-02-17

## 2020-02-14 ASSESSMENT — PAIN DESCRIPTION - ORIENTATION
ORIENTATION: RIGHT
ORIENTATION: RIGHT

## 2020-02-14 ASSESSMENT — ENCOUNTER SYMPTOMS
SHORTNESS OF BREATH: 0
COUGH: 0
EYE PAIN: 0
VOMITING: 0
ABDOMINAL PAIN: 0
DIARRHEA: 0
BACK PAIN: 0
NAUSEA: 0

## 2020-02-14 ASSESSMENT — PAIN DESCRIPTION - LOCATION
LOCATION: ANKLE
LOCATION: ANKLE

## 2020-02-14 ASSESSMENT — PAIN DESCRIPTION - DESCRIPTORS
DESCRIPTORS: ACHING
DESCRIPTORS: ACHING;SHARP

## 2020-02-14 ASSESSMENT — PAIN DESCRIPTION - PAIN TYPE: TYPE: ACUTE PAIN

## 2020-02-14 ASSESSMENT — PAIN DESCRIPTION - FREQUENCY: FREQUENCY: INTERMITTENT

## 2020-02-14 ASSESSMENT — PAIN SCALES - GENERAL
PAINLEVEL_OUTOF10: 7
PAINLEVEL_OUTOF10: 8

## 2020-02-14 NOTE — ED PROVIDER NOTES
MEDICAL DECISION MAKING / ED COURSE:      PROCEDURES:   Procedures    None    Patient was given:  Medications   ibuprofen (ADVIL;MOTRIN) tablet 800 mg (800 mg Oral Not Given 2/14/20 1633)   acetaminophen (TYLENOL) tablet 650 mg (650 mg Oral Not Given 2/14/20 1633)       Differential diagnosis includes but not limited to fracture, dislocation, vascular injury, neurologic injury. Patient seen and examined today for ankle pain s/p fall down stairs last week. See HPI for patient presentation. Patient is in no acute distress, nontoxic, afebrile with unremarkable vital signs. No evidence of neurovascular injury. Patient given cold compress to apply to injured area. No systemic symptoms and nontoxic; given exam and history, low suspicion for septic arthritis, pyomyositis or necrotizing fascitis. Compartments soft, do not suspect compartment syndrome. No calf swelling/edema to suggest DVT. She has pain along upper Achilles tendon without defect and Hernandez test was negative. Patient cannot take NSAIDs due to history of GI bleed. Declined Tylenol in the ED. Plain films reviewed and interpreted: negative. ACE wrap applied and given crutches. Differential includes Achilles tendinitis, gastrocnemius strain, ankle sprain. At this time I believe patient's presentation does not warrant further workup with labs or imaging in the emergency department and is stable for discharge home. I educated the patient on partial weightbearing with crutches  I instructed the patient to follow up as an outpatient within the next few days. She has already called orthopedist at [facility] and has an appointment scheduled on 2/18. I instructed the patient to return to the ED immediately for any new or worsening symptoms. The patient verbalizes understanding.     I estimate there is LOW risk for FRACTURE, COMPARTMENT SYNDROME, DEEP VENOUS THROMBOSIS, SEPTIC ARTHRITIS, TENDON OR NEUROVASCULAR INJURY, thus I consider the discharge disposition reasonable. The patient tolerated their visit well. I evaluated the patient. The physician was available for consultation as needed. The patient and / or the family were informed of the results of any tests, a time was given to answer questions, a plan was proposed and they agreed with plan. CLINICAL IMPRESSION:  1. Sprain of right ankle, unspecified ligament, initial encounter    2. Acute right ankle pain        DISPOSITION Decision To Discharge 02/14/2020 04:51:37 PM      PATIENT REFERRED TO:    follow up with your orthopedist at [address] [place] [address] as scheduled 2/18          DISCHARGE MEDICATIONS:  New Prescriptions    TRAMADOL (ULTRAM) 50 MG TABLET    Take 1 tablet by mouth every 6 hours as needed for Pain for up to 3 days.        DISCONTINUED MEDICATIONS:  Discontinued Medications    No medications on file              (Please note the MDM and HPI sections of this note were completed with a voice recognition program.  Efforts were made to edit the dictations but occasionally words are mis-transcribed.)    Electronically signed, Saintclair Oms, [place],           [place] O[place], [place]  02/14/20 0426

## 2020-03-12 ENCOUNTER — HOSPITAL ENCOUNTER (EMERGENCY)
Age: 35
Discharge: LEFT BEFORE DISCHARGE | End: 2020-03-13
Payer: COMMERCIAL

## 2020-03-12 VITALS
HEART RATE: 79 BPM | SYSTOLIC BLOOD PRESSURE: 167 MMHG | DIASTOLIC BLOOD PRESSURE: 115 MMHG | RESPIRATION RATE: 18 BRPM | TEMPERATURE: 97.8 F | OXYGEN SATURATION: 98 %

## 2020-03-12 ASSESSMENT — PAIN SCALES - GENERAL: PAINLEVEL_OUTOF10: 0

## 2020-11-05 ENCOUNTER — HOSPITAL ENCOUNTER (EMERGENCY)
Age: 35
Discharge: HOME | End: 2020-11-05
Payer: COMMERCIAL

## 2020-11-05 ENCOUNTER — APPOINTMENT (OUTPATIENT)
Dept: CT IMAGING | Age: 35
End: 2020-11-05
Payer: COMMERCIAL

## 2020-11-05 VITALS
WEIGHT: 293 LBS | HEART RATE: 73 BPM | OXYGEN SATURATION: 94 % | SYSTOLIC BLOOD PRESSURE: 138 MMHG | BODY MASS INDEX: 41.95 KG/M2 | DIASTOLIC BLOOD PRESSURE: 88 MMHG | RESPIRATION RATE: 18 BRPM | TEMPERATURE: 99.4 F | HEIGHT: 70 IN

## 2020-11-05 LAB
A/G RATIO: 1.3 (ref 1.1–2.2)
ABO/RH: NORMAL
ALBUMIN SERPL-MCNC: 4.3 G/DL (ref 3.4–5)
ALP BLD-CCNC: 113 U/L (ref 40–129)
ALT SERPL-CCNC: 13 U/L (ref 10–40)
ANION GAP SERPL CALCULATED.3IONS-SCNC: 12 MMOL/L (ref 3–16)
ANTIBODY IDENTIFICATION: NORMAL
ANTIBODY SCREEN: NORMAL
AST SERPL-CCNC: 15 U/L (ref 15–37)
BASOPHILS ABSOLUTE: 0.1 K/UL (ref 0–0.2)
BASOPHILS RELATIVE PERCENT: 1.3 %
BILIRUB SERPL-MCNC: <0.2 MG/DL (ref 0–1)
BILIRUBIN URINE: NEGATIVE
BLOOD, URINE: NEGATIVE
BUN BLDV-MCNC: 7 MG/DL (ref 7–20)
CALCIUM SERPL-MCNC: 9.2 MG/DL (ref 8.3–10.6)
CHLORIDE BLD-SCNC: 100 MMOL/L (ref 99–110)
CLARITY: CLEAR
CO2: 25 MMOL/L (ref 21–32)
COLOR: YELLOW
CREAT SERPL-MCNC: 0.6 MG/DL (ref 0.6–1.1)
DAT IGG CAPTURE: NORMAL
EOSINOPHILS ABSOLUTE: 0.1 K/UL (ref 0–0.6)
EOSINOPHILS RELATIVE PERCENT: 1.8 %
GFR AFRICAN AMERICAN: >60
GFR NON-AFRICAN AMERICAN: >60
GLOBULIN: 3.2 G/DL
GLUCOSE BLD-MCNC: 89 MG/DL (ref 70–99)
GLUCOSE URINE: NEGATIVE MG/DL
HCG QUALITATIVE: NEGATIVE
HCT VFR BLD CALC: 41.6 % (ref 36–48)
HEMOGLOBIN: 13.9 G/DL (ref 12–16)
KETONES, URINE: NEGATIVE MG/DL
LEUKOCYTE ESTERASE, URINE: NEGATIVE
LIPASE: 21 U/L (ref 13–60)
LYMPHOCYTES ABSOLUTE: 1.4 K/UL (ref 1–5.1)
LYMPHOCYTES RELATIVE PERCENT: 29.8 %
MCH RBC QN AUTO: 29.6 PG (ref 26–34)
MCHC RBC AUTO-ENTMCNC: 33.3 G/DL (ref 31–36)
MCV RBC AUTO: 88.8 FL (ref 80–100)
MICROSCOPIC EXAMINATION: NORMAL
MONOCYTES ABSOLUTE: 0.3 K/UL (ref 0–1.3)
MONOCYTES RELATIVE PERCENT: 5.9 %
NEUTROPHILS ABSOLUTE: 2.9 K/UL (ref 1.7–7.7)
NEUTROPHILS RELATIVE PERCENT: 61.2 %
NITRITE, URINE: NEGATIVE
PDW BLD-RTO: 13.4 % (ref 12.4–15.4)
PH UA: 6 (ref 5–8)
PLATELET # BLD: 341 K/UL (ref 135–450)
PMV BLD AUTO: 9 FL (ref 5–10.5)
POTASSIUM REFLEX MAGNESIUM: 4.2 MMOL/L (ref 3.5–5.1)
PROTEIN UA: NEGATIVE MG/DL
RBC # BLD: 4.69 M/UL (ref 4–5.2)
SODIUM BLD-SCNC: 137 MMOL/L (ref 136–145)
SPECIFIC GRAVITY UA: <1.005 (ref 1–1.03)
TOTAL PROTEIN: 7.5 G/DL (ref 6.4–8.2)
URINE REFLEX TO CULTURE: NORMAL
URINE TYPE: NORMAL
UROBILINOGEN, URINE: 0.2 E.U./DL
WBC # BLD: 4.7 K/UL (ref 4–11)

## 2020-11-05 PROCEDURE — 86900 BLOOD TYPING SEROLOGIC ABO: CPT

## 2020-11-05 PROCEDURE — 86870 RBC ANTIBODY IDENTIFICATION: CPT

## 2020-11-05 PROCEDURE — 85025 COMPLETE CBC W/AUTO DIFF WBC: CPT

## 2020-11-05 PROCEDURE — 74177 CT ABD & PELVIS W/CONTRAST: CPT

## 2020-11-05 PROCEDURE — 81003 URINALYSIS AUTO W/O SCOPE: CPT

## 2020-11-05 PROCEDURE — 99284 EMERGENCY DEPT VISIT MOD MDM: CPT

## 2020-11-05 PROCEDURE — 83690 ASSAY OF LIPASE: CPT

## 2020-11-05 PROCEDURE — 86880 COOMBS TEST DIRECT: CPT

## 2020-11-05 PROCEDURE — 96375 TX/PRO/DX INJ NEW DRUG ADDON: CPT

## 2020-11-05 PROCEDURE — 6360000004 HC RX CONTRAST MEDICATION: Performed by: PHYSICIAN ASSISTANT

## 2020-11-05 PROCEDURE — 6360000002 HC RX W HCPCS: Performed by: PHYSICIAN ASSISTANT

## 2020-11-05 PROCEDURE — 86901 BLOOD TYPING SEROLOGIC RH(D): CPT

## 2020-11-05 PROCEDURE — 96374 THER/PROPH/DIAG INJ IV PUSH: CPT

## 2020-11-05 PROCEDURE — 80053 COMPREHEN METABOLIC PANEL: CPT

## 2020-11-05 PROCEDURE — 86850 RBC ANTIBODY SCREEN: CPT

## 2020-11-05 PROCEDURE — 84703 CHORIONIC GONADOTROPIN ASSAY: CPT

## 2020-11-05 RX ORDER — MORPHINE SULFATE 4 MG/ML
4 INJECTION, SOLUTION INTRAMUSCULAR; INTRAVENOUS ONCE
Status: COMPLETED | OUTPATIENT
Start: 2020-11-05 | End: 2020-11-05

## 2020-11-05 RX ORDER — TRAMADOL HYDROCHLORIDE 50 MG/1
50 TABLET ORAL EVERY 6 HOURS PRN
Qty: 12 TABLET | Refills: 0 | Status: SHIPPED | OUTPATIENT
Start: 2020-11-05 | End: 2020-11-08

## 2020-11-05 RX ORDER — ONDANSETRON 4 MG/1
4-8 TABLET, ORALLY DISINTEGRATING ORAL EVERY 12 HOURS PRN
Qty: 12 TABLET | Refills: 0 | Status: SHIPPED | OUTPATIENT
Start: 2020-11-05 | End: 2021-01-22

## 2020-11-05 RX ORDER — ONDANSETRON 2 MG/ML
4 INJECTION INTRAMUSCULAR; INTRAVENOUS ONCE
Status: COMPLETED | OUTPATIENT
Start: 2020-11-05 | End: 2020-11-05

## 2020-11-05 RX ADMIN — MORPHINE SULFATE 4 MG: 4 INJECTION, SOLUTION INTRAMUSCULAR; INTRAVENOUS at 15:45

## 2020-11-05 RX ADMIN — IOPAMIDOL 75 ML: 755 INJECTION, SOLUTION INTRAVENOUS at 16:36

## 2020-11-05 RX ADMIN — ONDANSETRON 4 MG: 2 INJECTION INTRAMUSCULAR; INTRAVENOUS at 15:45

## 2020-11-05 ASSESSMENT — ENCOUNTER SYMPTOMS
EYE DISCHARGE: 0
BACK PAIN: 0
EYE REDNESS: 0
SORE THROAT: 0
APNEA: 0
NAUSEA: 1
CHOKING: 0
SHORTNESS OF BREATH: 0
ABDOMINAL PAIN: 1
VOMITING: 1
FACIAL SWELLING: 0

## 2020-11-05 ASSESSMENT — PAIN SCALES - GENERAL
PAINLEVEL_OUTOF10: 8
PAINLEVEL_OUTOF10: 5
PAINLEVEL_OUTOF10: 6
PAINLEVEL_OUTOF10: 8

## 2020-11-05 ASSESSMENT — PAIN DESCRIPTION - LOCATION
LOCATION: ABDOMEN

## 2020-11-05 ASSESSMENT — PAIN DESCRIPTION - FREQUENCY
FREQUENCY: INTERMITTENT
FREQUENCY: INTERMITTENT

## 2020-11-05 ASSESSMENT — PAIN DESCRIPTION - PROGRESSION
CLINICAL_PROGRESSION: GRADUALLY IMPROVING
CLINICAL_PROGRESSION: GRADUALLY IMPROVING

## 2020-11-05 ASSESSMENT — PAIN DESCRIPTION - DESCRIPTORS
DESCRIPTORS: SHARP

## 2020-11-05 ASSESSMENT — PAIN DESCRIPTION - ONSET
ONSET: ON-GOING
ONSET: ON-GOING

## 2020-11-05 ASSESSMENT — PAIN DESCRIPTION - PAIN TYPE
TYPE: ACUTE PAIN
TYPE: ACUTE PAIN

## 2020-11-05 NOTE — ED NOTES
Discharge and education instructions reviewed. Patient verbalized understanding, teach-back successful. Patient denied questions at this time. No acute distress noted. Patient instructed to follow-up as noted - return to emergency department if symptoms worsen. Patient verbalized understanding. Discharged per EDMD with discharged instructions.        Torsten Kathleen RN  11/05/20 7303

## 2020-11-05 NOTE — ED PROVIDER NOTES
**ADVANCED PRACTICE PROVIDER, I HAVE EVALUATED THIS PATIENT**        [address]      Pt Name: Kate Carmona  ILP:[id number]  [place] 1985  Date of evaluation: 2020  Provider: Anahi Stevens PA-C      Chief Complaint:    Chief Complaint   Patient presents with    Abdominal Pain     sharp pain x3 days    Emesis     started today, started throwing up blood today       Nursing Notes, Past Medical Hx, Past Surgical Hx, Social Hx, Allergies, and Family Hx were all reviewed and agreed with or any disagreements were addressed in the HPI.    HPI:  (Location, Duration, Timing, Severity, Quality, Assoc Sx, Context, Modifying factors)  This is a  38 y.o. female complaint of abdominal pain right-sided for the last 3 days. And today she was nausea and vomiting and she noticed some blood in her emesis. She denies fever, no chest pain, the patient denies back pain. No weaknesses. No headaches. She says she was seen couple months ago for the same never given a referral.'s been taken Tylenol with no relief. Denies black tarry stool. Denied bright red blood in her stool. No chest pain. No other complaints.       PastMedical/Surgical History:      Diagnosis Date    Asthma     dx'd at 23 yo, not well-controlled    Hypertension     with this pregnancy    Obesity     Pre-eclampsia     Pulmonary emboli ([address]          Procedure Laterality Date     SECTION      x 3    CHOLECYSTECTOMY, LAPAROSCOPIC  2018     Laparoscopic cholecystectomy with cholangiogram    UPPER GASTROINTESTINAL ENDOSCOPY N/A 2019    EGD DIAGNOSTIC ONLY performed by Luisito Blanca MD at [address] [facility] [address]         Medications:  Previous Medications    ALBUTEROL SULFATE  (90 BASE) MCG/ACT INHALER    Inhale 2 puffs into the lungs every 6 hours as needed for Wheezing    BUDESONIDE-FORMOTEROL (SYMBICORT) 160-4.5 MCG/ACT AERO Inhale 2 puffs into the lungs 2 times daily    FAMOTIDINE (PEPCID) 20 MG TABLET    Take 1 tablet by mouth 2 times daily    HYDROCHLOROTHIAZIDE (HYDRODIURIL) 25 MG TABLET    Take 1 tablet by mouth daily    ONDANSETRON (ZOFRAN ODT) 4 MG DISINTEGRATING TABLET    Take 1 tablet by mouth every 8 hours as needed for Nausea    PANTOPRAZOLE (PROTONIX) 40 MG TABLET    Take 1 tablet by mouth daily         Review of Systems:  Review of Systems   Constitutional: Negative for chills and fever. HENT: Negative for congestion, facial swelling and sore throat. Eyes: Negative for discharge and redness. Respiratory: Negative for apnea, choking and shortness of breath. Cardiovascular: Negative for chest pain. Gastrointestinal: Positive for abdominal pain, nausea and vomiting. Genitourinary: Negative for dysuria. Musculoskeletal: Negative for back pain, neck pain and neck stiffness. Neurological: Negative for dizziness, tremors, seizures, weakness and headaches. All other systems reviewed and are negative. Positives and Pertinent negatives as per HPI. Except as noted above in the ROS, problem specific ROS was completed and is negative. Physical Exam:  Physical Exam  Vitals signs and nursing note reviewed. Constitutional:       Appearance: She is well-developed. She is not diaphoretic. HENT:      Head: Normocephalic and atraumatic. Nose: Nose normal.      Mouth/Throat:      Mouth: Mucous membranes are moist.      Pharynx: Oropharynx is clear. Eyes:      General:         Right eye: No discharge. Left eye: No discharge. Neck:      Musculoskeletal: Normal range of motion and neck supple. Cardiovascular:      Rate and Rhythm: Normal rate and regular rhythm. Heart sounds: Normal heart sounds. No murmur. No friction rub. No gallop. Pulmonary:      Effort: Pulmonary effort is normal. No respiratory distress. Breath sounds: Normal breath sounds. No wheezing or rales.    Chest: Chest wall: No tenderness. Abdominal:      General: Abdomen is flat. Bowel sounds are normal. There is no distension. Palpations: There is no mass. Tenderness: There is abdominal tenderness in the right upper quadrant and right lower quadrant. There is no right CVA tenderness, left CVA tenderness, guarding or rebound. Musculoskeletal: Normal range of motion. Skin:     General: Skin is warm and dry. Neurological:      Mental Status: She is alert and oriented to person, place, and time.    Psychiatric:         Behavior: Behavior normal.         MEDICAL DECISION MAKING    Vitals:    Vitals:    11/05/20 1203   BP: (!) 136/96   Pulse: 73   Resp: 16   Temp: 99.4 °F (37.4 °C)   TempSrc: Oral   SpO2: 94%   Weight: (!) 327 lb 2.6 oz (148.4 kg)   Height: 5' 10\" (1.778 m)       LABS:  Labs Reviewed   HCG, SERUM, QUALITATIVE    Narrative:     Performed at:  [facility]  [address] [place], [place] [address]   Phone [phone]   CBC WITH AUTO DIFFERENTIAL    Narrative:     Performed at:  [facility]  [address] [place], [place] [address]   Phone [phone]   COMPREHENSIVE METABOLIC PANEL W/ REFLEX TO MG FOR LOW K    Narrative:     Performed at:  [facility]  [address] [place], [place] [address]   Phone [phone]   LIPASE    Narrative:     Performed at:  [facility]  [address] [place], [place] [address]   Phone [phone]   URINE RT REFLEX TO CULTURE    Narrative:     Performed at:  [facility]  [address] [place], [place] [address]   Phone [phone]   TYPE AND SCREEN    Narrative:     Performed at:  [facility]  [address] [place], [place] [address]   Phone [phone]   ANTIBODY IDENTIFICATION    Narrative:     Performed at:  [facility]  [address] [facility] [address]   Phone [phone]   DAWOOD IGG TUBE    Narrative:     Performed at:  [facility]  [address] [facility] [address]   Phone [phone] labs reviewed and werenegative at this time or not returned at the time of this note. RADIOLOGY:   Non-plain film images such as CT, Ultrasound and MRI are read by the radiologist. Juli Thurman PA-C have directly visualized the radiologic plain film image(s) with the below findings:        Interpretation per the Radiologist below, if available at the time of this note:    CT ABDOMEN PELVIS W IV CONTRAST Additional Contrast? IV   Final Result   No acute intra-abdominal or intrapelvic abnormality noted. No results found. MEDICAL DECISION MAKING / ED COURSE:      PROCEDURES:   Procedures    None    Patient was given:  Medications   ondansetron (ZOFRAN) injection 4 mg (4 mg Intravenous Given 11/5/20 1545)   morphine (PF) injection 4 mg (4 mg Intravenous Given 11/5/20 1545)   iopamidol (ISOVUE-370) 76 % injection 75 mL (75 mLs Intravenous Given 11/5/20 1636)       Emergency room course: Patient on exam pupils equal round and reactive to light extraocular movements intact. Throat is clear. Neck is supple full range of motion without tenderness. No midline tender cervical, thoracic or lumbar spine. Cardiovascular regular rhythm, lungs are clear. No wheeze, rales or rhonchi noted. Abdomen is soft with mild right upper quadrant and right sided tenderness down to the right lower quadrant. No CVA or flank tenderness. No palpable mass. No rebound or guarding noted. Normal bowel sounds all 4 quadrant. Full range of motion all extremities. Neurologically she has no motor or sensory deficit noted alert oriented x4. Does not appear to be in acute distress.       Lab results from today shows:  Qualitative hCG is negative    CBC within normal limits with a white count of 4.7. CMP shows sodium 137, potassium 4.2, chloride 100 BUN is 7 creatinine 0.6. Normal transaminases. Lipase 21.0. Type and screen shows O+ with antibody positive. Antibody identification shows positive anti-E      Urinalysis shows negative leukocytes, negative for nitrites, negative for blood, negative ketones. CT abdomen shows no acute intra-abdominal or intrapelvic abnormality identified. Did discuss patient lab results CT results with her. I will refer her to GI. Inform her to follow-up with GI she may need a scope or colonoscopy if her pain persists. Particular if she continued coughing up blood. I will give her Zofran for nausea vomiting and I will give her tramadol for abdominal pain. She will be discharged stable condition. The patient tolerated their visit well. I evaluated the patient. The physician was available for consultation as needed. The patient and / or the family were informed of the results of any tests, a time was given to answer questions, a plan was proposed and they agreed with plan. CLINICAL IMPRESSION:  1. Abdominal pain, unspecified abdominal location    2. Hematemesis, presence of nausea not specified        DISPOSITION  DISPOSITION Decision To Discharge 11/05/2020 05:34:56 PM          PATIENT REFERRED TO:  Mook Olivares MD  [address] [facility]. [address]  [address]    Call in 1 day        DISCHARGE MEDICATIONS:  New Prescriptions    ONDANSETRON (ZOFRAN ODT) 4 MG DISINTEGRATING TABLET    Take 1-2 tablets by mouth every 12 hours as needed for Nausea    TRAMADOL (ULTRAM) 50 MG TABLET    Take 1 tablet by mouth every 6 hours as needed for Pain (MAY TAKE 2 TABS EVERY 6 HOURS FOR SEVERE PAIN) for up to 3 days.        DISCONTINUED MEDICATIONS:  Discontinued Medications    No medications on file              (Please note the MDM and HPI sections of this note were completed with a voice recognition program.  Efforts were made to edit the dictations but occasionally words are mis-transcribed.)    Electronically signed, Garret Burnham PA-C,          Garret Burnham PA-C  11/05/20 5433

## 2020-12-06 ENCOUNTER — HOSPITAL ENCOUNTER (EMERGENCY)
Age: 35
Discharge: HOME | End: 2020-12-06
Payer: COMMERCIAL

## 2020-12-06 ENCOUNTER — APPOINTMENT (OUTPATIENT)
Dept: GENERAL RADIOLOGY | Age: 35
End: 2020-12-06
Payer: COMMERCIAL

## 2020-12-06 VITALS
OXYGEN SATURATION: 100 % | TEMPERATURE: 98.5 F | DIASTOLIC BLOOD PRESSURE: 107 MMHG | SYSTOLIC BLOOD PRESSURE: 165 MMHG | RESPIRATION RATE: 20 BRPM | HEART RATE: 79 BPM

## 2020-12-06 LAB
EKG ATRIAL RATE: 91 BPM
EKG DIAGNOSIS: NORMAL
EKG P AXIS: 65 DEGREES
EKG P-R INTERVAL: 170 MS
EKG Q-T INTERVAL: 346 MS
EKG QRS DURATION: 80 MS
EKG QTC CALCULATION (BAZETT): 425 MS
EKG R AXIS: 71 DEGREES
EKG T AXIS: 59 DEGREES
EKG VENTRICULAR RATE: 91 BPM

## 2020-12-06 PROCEDURE — 93005 ELECTROCARDIOGRAM TRACING: CPT | Performed by: PHYSICIAN ASSISTANT

## 2020-12-06 PROCEDURE — 71045 X-RAY EXAM CHEST 1 VIEW: CPT

## 2020-12-06 PROCEDURE — 93010 ELECTROCARDIOGRAM REPORT: CPT | Performed by: INTERNAL MEDICINE

## 2020-12-06 PROCEDURE — U0003 INFECTIOUS AGENT DETECTION BY NUCLEIC ACID (DNA OR RNA); SEVERE ACUTE RESPIRATORY SYNDROME CORONAVIRUS 2 (SARS-COV-2) (CORONAVIRUS DISEASE [COVID-19]), AMPLIFIED PROBE TECHNIQUE, MAKING USE OF HIGH THROUGHPUT TECHNOLOGIES AS DESCRIBED BY CMS-2020-01-R: HCPCS

## 2020-12-06 PROCEDURE — U0002 COVID-19 LAB TEST NON-CDC: HCPCS

## 2020-12-06 PROCEDURE — 6370000000 HC RX 637 (ALT 250 FOR IP): Performed by: PHYSICIAN ASSISTANT

## 2020-12-06 PROCEDURE — 94761 N-INVAS EAR/PLS OXIMETRY MLT: CPT

## 2020-12-06 PROCEDURE — 99282 EMERGENCY DEPT VISIT SF MDM: CPT

## 2020-12-06 PROCEDURE — 94640 AIRWAY INHALATION TREATMENT: CPT

## 2020-12-06 RX ORDER — PREDNISONE 20 MG/1
TABLET ORAL
Qty: 18 TABLET | Refills: 0 | Status: ON HOLD | OUTPATIENT
Start: 2020-12-06 | End: 2020-12-16 | Stop reason: HOSPADM

## 2020-12-06 RX ORDER — AZITHROMYCIN 250 MG/1
250 TABLET, FILM COATED ORAL SEE ADMIN INSTRUCTIONS
Qty: 6 TABLET | Refills: 0 | Status: SHIPPED | OUTPATIENT
Start: 2020-12-06 | End: 2020-12-11

## 2020-12-06 RX ORDER — ALBUTEROL SULFATE 90 UG/1
2 AEROSOL, METERED RESPIRATORY (INHALATION) EVERY 6 HOURS PRN
Status: DISCONTINUED | OUTPATIENT
Start: 2020-12-06 | End: 2020-12-06 | Stop reason: HOSPADM

## 2020-12-06 RX ORDER — ALBUTEROL SULFATE 90 UG/1
2 AEROSOL, METERED RESPIRATORY (INHALATION) EVERY 4 HOURS PRN
Qty: 1 INHALER | Refills: 0 | Status: SHIPPED | OUTPATIENT
Start: 2020-12-06

## 2020-12-06 RX ORDER — IPRATROPIUM BROMIDE AND ALBUTEROL SULFATE 2.5; .5 MG/3ML; MG/3ML
1 SOLUTION RESPIRATORY (INHALATION) ONCE
Status: DISCONTINUED | OUTPATIENT
Start: 2020-12-06 | End: 2020-12-06

## 2020-12-06 RX ORDER — PREDNISONE 20 MG/1
60 TABLET ORAL ONCE
Status: COMPLETED | OUTPATIENT
Start: 2020-12-06 | End: 2020-12-06

## 2020-12-06 RX ORDER — ACETAMINOPHEN 500 MG
500 TABLET ORAL EVERY 6 HOURS PRN
Qty: 30 TABLET | Refills: 0 | Status: SHIPPED | OUTPATIENT
Start: 2020-12-06 | End: 2021-01-22

## 2020-12-06 RX ADMIN — Medication 2 PUFF: at 13:49

## 2020-12-06 RX ADMIN — PREDNISONE 60 MG: 20 TABLET ORAL at 13:15

## 2020-12-06 ASSESSMENT — ENCOUNTER SYMPTOMS
BACK PAIN: 0
EYE PAIN: 0
ABDOMINAL PAIN: 0
DIARRHEA: 0
SHORTNESS OF BREATH: 1
WHEEZING: 1
VOMITING: 0
COUGH: 1
NAUSEA: 0
CHEST TIGHTNESS: 1

## 2020-12-06 NOTE — ED PROVIDER NOTES
[address] [facility]        Pt Name: Jameson Buchanan  MRN: [id number]  [place] 1985  Date of evaluation: 12/6/2020  Provider: CANDELARIO Staton  PCP: Savanna Morales MD    KRYSTAL. I have evaluated this patient. My supervising physician was available for consultation. CHIEF COMPLAINT       Chief Complaint   Patient presents with    Shortness of Breath     increased cough and shortness of breath x3 days. HX of asthma. tightness in chest x3 days. HISTORY OF PRESENT ILLNESS   (Location, Timing/Onset, Context/Setting, Quality, Duration, Modifying Factors, Severity, Associated Signs and Symptoms)  Note limiting factors. Jameson Buchanan is a 38 y.o. female who presents to the emergency department for evaluation of a 3-day history of shortness of breath, chest tightness, wheezing. Patient states this is similar to asthma however using her inhaler at home has not helped with her symptoms. She last used her inhaler last night. She is concerned that she could have Covid. 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 She denies any known exposure to COVID-19. The patient denies fever or chills, chest pain, abdominal pain, nausea, vomiting, diarrhea. No loss of smell or taste. No headache, rhinorrhea, or other URI symptoms. No recent travel, exposure to sick contacts, or recent antibiotics. No other acute concerns, associated symptoms or modifying factors. Nursing Notes were all reviewed and agreed with or any disagreements were addressed in the HPI. REVIEW OF SYSTEMS    (2-9 systems for level 4, 10 or more for level 5)     Review of Systems   Constitutional: Positive for chills. Negative for fatigue and fever. Eyes: Negative for pain. Respiratory: Positive for cough, chest tightness, shortness of breath and wheezing.     Cardiovascular: Negative for chest pain. Gastrointestinal: Negative for abdominal pain, diarrhea, nausea and vomiting. Genitourinary: Negative for dysuria. Musculoskeletal: Negative for back pain, neck pain and neck stiffness. Skin: Negative for rash. Neurological: Negative for dizziness and headaches. Psychiatric/Behavioral: Negative for confusion. Positives and Pertinent negatives as per HPI. Except as noted above in the ROS, all other systems were reviewed and negative.        PAST MEDICAL HISTORY     Past Medical History:   Diagnosis Date    Asthma     dx'd at 21 yo, not well-controlled    Hypertension     with this pregnancy    Obesity     Pre-eclampsia     Pulmonary emboli (H[facility])          SURGICAL HISTORY     Past Surgical History:   Procedure Laterality Date     SECTION      x 3    CHOLECYSTECTOMY, LAPAROSCOPIC  2018     Laparoscopic cholecystectomy with cholangiogram    UPPER GASTROINTESTINAL ENDOSCOPY N/A 2019    EGD DIAGNOSTIC ONLY performed by Molly Marcial MD at [address] [place] [address]       Previous Medications    BUDESONIDE-FORMOTEROL (SYMBICORT) 160-4.5 MCG/ACT AERO    Inhale 2 puffs into the lungs 2 times daily    FAMOTIDINE (PEPCID) 20 MG TABLET    Take 1 tablet by mouth 2 times daily    HYDROCHLOROTHIAZIDE (HYDRODIURIL) 25 MG TABLET    Take 1 tablet by mouth daily    ONDANSETRON (ZOFRAN ODT) 4 MG DISINTEGRATING TABLET    Take 1 tablet by mouth every 8 hours as needed for Nausea    ONDANSETRON (ZOFRAN ODT) 4 MG DISINTEGRATING TABLET    Take 1-2 tablets by mouth every 12 hours as needed for Nausea    PANTOPRAZOLE (PROTONIX) 40 MG TABLET    Take 1 tablet by mouth daily         ALLERGIES     Shellfish-derived products    FAMILYHISTORY       Family History   Problem Relation Age of Onset    High Blood Pressure Father     Diabetes Paternal Grandmother     High Blood Pressure Paternal Grandmother     Diabetes Reynold Heller High Blood Pressure Paternal Grandfather     Asthma Mother     Cancer Maternal Grandmother           SOCIAL HISTORY       Social History     Tobacco Use    Smoking status: Never Smoker    Smokeless tobacco: Never Used   Substance Use Topics    Alcohol use: No     Comment: rarely    Drug use: No       SCREENINGS             PHYSICAL EXAM    (up to 7 for level 4, 8 or more for level 5)     ED Triage Vitals [12/06/20 1252]   BP Temp Temp Source Pulse Resp SpO2 Height Weight   (!) 165/107 98.5 °F (36.9 °C) Oral 96 18 96 % -- --       Physical Exam  Vitals signs and nursing note reviewed. Constitutional:       General: She is not in acute distress. Appearance: She is well-developed. She is obese. She is not ill-appearing or diaphoretic. HENT:      Head: Normocephalic and atraumatic. Eyes:      General:         Right eye: No discharge. Left eye: No discharge. Neck:      Musculoskeletal: Normal range of motion and neck supple. Pulmonary:      Effort: Pulmonary effort is normal. No respiratory distress. Breath sounds: No stridor. Wheezing (diffuse expiratory) present. Musculoskeletal: Normal range of motion. Skin:     General: Skin is warm and dry. Coloration: Skin is not pale. Neurological:      Mental Status: She is alert and oriented to person, place, and time. Comments: No gross facial drooping. Moves all 4 extremities spontaneously. Psychiatric:         Behavior: Behavior normal.         DIAGNOSTIC RESULTS   LABS:    Labs Reviewed   [id number]   COVID-19   COVID-19   COVID-19       All other labs were within normal range or not returned as of this dictation. EKG: All EKG's are interpreted by the Emergency Department Physician in the absence of a cardiologist.  Please see their note for interpretation of EKG.       RADIOLOGY:   Non-plain film images such as CT, Ultrasound and MRI are read by the radiologist. Plain radiographic images are visualized and preliminarily interpreted by the ED Provider with the below findings:        Interpretation per the Radiologist below, if available at the time of this note:    XR CHEST PORTABLE   Final Result   Ill-defined pulmonary opacity right midlung could represent subsegmental   atelectasis or atypical pneumonia           No results found. PROCEDURES   Unless otherwise noted below, none     Procedures    CRITICAL CARE TIME   N/A    CONSULTS:  None      EMERGENCY DEPARTMENT COURSE and DIFFERENTIAL DIAGNOSIS/MDM:   Vitals:    Vitals:    12/06/20 1252   BP: (!) 165/107   Pulse: 96   Resp: 18   Temp: 98.5 °F (36.9 °C)   TempSrc: Oral   SpO2: 96%       Patient was given the following medications:  Medications   predniSONE (DELTASONE) tablet 60 mg (has no administration in time range)   albuterol sulfate  (90 Base) MCG/ACT inhaler 2 puff (2 puffs Inhalation Given 12/6/20 1349)           Differential Diagnosis: Acute asthma exacerbation, Acute COPD exacerbation, Hypoxemic Respiratory Distress, Pneumonia, Sepsis, Congestive Heart Failure, Myocardial Infarction, Pulmonary Embolus, ARDS, Pneumothorax, other    Patient presenting with shortness of breath. VS normal. Not in respiratory distress. Faint expiratory wheezing on exam. CXR with RML PNA vs atelectasis. Given clinical findings and history, I have low suspicion for  pneumothorax, or acute valvular failure. Patient with minimal risk factors for pulmonary embolism and atypical ACS. Treated with medications as listed above. COVID swab pending. The patient was specifically advised their symptoms are consistent with possible COVID-19 infection, and they need to self-isolate at home and restrict any activities outside of their home except for seeking medical care, and to wear a facemask whenever around others.   The patient was provided specific instructions related to COVID-19, along with recommendations for proper respiratory hygiene and instructions on prevention of transmission of infection to others. I'll prescribe albuterol inhaler and prednisone burst as well as Zpak to cover for atypical pneumonia. At this time I believe patient's presentation does not warrant further workup with labs or imaging in the emergency department and is stable for discharge home. I discussed with Isa Randle and/or family the exam results, diagnosis, care, prognosis, reasons to return to the emergency department, and the importance of follow up with primary care provider. They verbalized understanding and were discharged in stable condition. Isa Randle is well appearing, non-toxic, and afebrile at the time of discharge. FINAL IMPRESSION      1. Pneumonia of right middle lobe due to infectious organism    2. Exacerbation of asthma, unspecified asthma severity, unspecified whether persistent    3. Person under investigation for COVID-19          DISPOSITION/PLAN   DISPOSITION        PATIENT REFERREDTO:  Charly Kan MD  [id number]  [address] [facility] [phone]    Call       [facility] Emergency Department  [address] [place] [address]  [phone]    If symptoms worsen      DISCHARGE MEDICATIONS:  New Prescriptions    ACETAMINOPHEN (APAP EXTRA STRENGTH) 500 MG TABLET    Take 1 tablet by mouth every 6 hours as needed for Pain    ALBUTEROL SULFATE HFA (PROVENTIL HFA) 108 (90 BASE) MCG/ACT INHALER    Inhale 2 puffs into the lungs every 4 hours as needed for Wheezing or Shortness of Breath (Space out to every 6 hours as symptoms improve) Space out to every 6 hours as symptoms improve.     AZITHROMYCIN (ZITHROMAX) 250 MG TABLET    Take 1 tablet by mouth See Admin Instructions for 5 days 500mg on day 1 followed by 250mg on days 2 - 5    PREDNISONE (DELTASONE) 20 MG TABLET    Take 3 tablets daily for 3 days, then 2 tablets daily for 3 days, then 1 tablets daily for 3 days       DISCONTINUED MEDICATIONS:  Discontinued Medications    ALBUTEROL SULFATE  (90 BASE) MCG/ACT INHALER    Inhale 2 puffs into the lungs every 6 hours as needed for Wheezing              (Please note that portions of this note were completed with a voice recognition program.  Efforts were made to edit the dictations but occasionally words are mis-transcribed.)    CANDELARIO Mancini (electronically signed)           CANDELARIO Mancini  12/06/20 6247

## 2020-12-07 ENCOUNTER — CARE COORDINATION (OUTPATIENT)
Dept: CARE COORDINATION | Age: 35
End: 2020-12-07

## 2020-12-07 LAB — SARS-COV-2, PCR: DETECTED

## 2020-12-07 NOTE — CARE COORDINATION
ACM:  [facility] for protocol for children exposed to Acquanetta Roman 19. Children's information says to call patient's pediatrician for order for child to be tested for COVID 19 to [facility] and they will schedule testing.

## 2020-12-15 ENCOUNTER — APPOINTMENT (OUTPATIENT)
Dept: CT IMAGING | Age: 35
End: 2020-12-15
Payer: COMMERCIAL

## 2020-12-15 ENCOUNTER — APPOINTMENT (OUTPATIENT)
Dept: GENERAL RADIOLOGY | Age: 35
End: 2020-12-15
Payer: COMMERCIAL

## 2020-12-15 ENCOUNTER — HOSPITAL ENCOUNTER (OUTPATIENT)
Age: 35
Setting detail: OBSERVATION
Discharge: HOME | End: 2020-12-16
Attending: STUDENT IN AN ORGANIZED HEALTH CARE EDUCATION/TRAINING PROGRAM | Admitting: INTERNAL MEDICINE
Payer: COMMERCIAL

## 2020-12-15 PROBLEM — R20.0 NUMBNESS AND TINGLING IN LEFT HAND: Status: ACTIVE | Noted: 2020-12-15

## 2020-12-15 PROBLEM — R20.2 NUMBNESS AND TINGLING IN LEFT HAND: Status: ACTIVE | Noted: 2020-12-15

## 2020-12-15 LAB
A/G RATIO: 1.6 (ref 1.1–2.2)
ALBUMIN SERPL-MCNC: 4.2 G/DL (ref 3.4–5)
ALP BLD-CCNC: 103 U/L (ref 40–129)
ALT SERPL-CCNC: 18 U/L (ref 10–40)
ANION GAP SERPL CALCULATED.3IONS-SCNC: 12 MMOL/L (ref 3–16)
AST SERPL-CCNC: 15 U/L (ref 15–37)
BASOPHILS ABSOLUTE: 0.1 K/UL (ref 0–0.2)
BASOPHILS RELATIVE PERCENT: 1.6 %
BILIRUB SERPL-MCNC: <0.2 MG/DL (ref 0–1)
BUN BLDV-MCNC: 8 MG/DL (ref 7–20)
CALCIUM SERPL-MCNC: 9.4 MG/DL (ref 8.3–10.6)
CHLORIDE BLD-SCNC: 102 MMOL/L (ref 99–110)
CHP ED QC CHECK: NORMAL
CO2: 25 MMOL/L (ref 21–32)
CREAT SERPL-MCNC: 0.7 MG/DL (ref 0.6–1.1)
EOSINOPHILS ABSOLUTE: 0.2 K/UL (ref 0–0.6)
EOSINOPHILS RELATIVE PERCENT: 2.4 %
GFR AFRICAN AMERICAN: >60
GFR NON-AFRICAN AMERICAN: >60
GLOBULIN: 2.6 G/DL
GLUCOSE BLD-MCNC: 95 MG/DL
GLUCOSE BLD-MCNC: 95 MG/DL (ref 70–99)
GLUCOSE BLD-MCNC: 99 MG/DL (ref 70–99)
HCT VFR BLD CALC: 41.8 % (ref 36–48)
HEMOGLOBIN: 14.1 G/DL (ref 12–16)
INR BLD: 0.95 (ref 0.86–1.14)
LYMPHOCYTES ABSOLUTE: 1.9 K/UL (ref 1–5.1)
LYMPHOCYTES RELATIVE PERCENT: 28.3 %
MCH RBC QN AUTO: 29.4 PG (ref 26–34)
MCHC RBC AUTO-ENTMCNC: 33.8 G/DL (ref 31–36)
MCV RBC AUTO: 86.9 FL (ref 80–100)
MONOCYTES ABSOLUTE: 0.4 K/UL (ref 0–1.3)
MONOCYTES RELATIVE PERCENT: 6 %
NEUTROPHILS ABSOLUTE: 4.2 K/UL (ref 1.7–7.7)
NEUTROPHILS RELATIVE PERCENT: 61.7 %
PDW BLD-RTO: 13.2 % (ref 12.4–15.4)
PERFORMED ON: NORMAL
PLATELET # BLD: 406 K/UL (ref 135–450)
PMV BLD AUTO: 8.2 FL (ref 5–10.5)
POTASSIUM REFLEX MAGNESIUM: 4.5 MMOL/L (ref 3.5–5.1)
PROTHROMBIN TIME: 11 SEC (ref 10–13.2)
RBC # BLD: 4.81 M/UL (ref 4–5.2)
SODIUM BLD-SCNC: 139 MMOL/L (ref 136–145)
TOTAL PROTEIN: 6.8 G/DL (ref 6.4–8.2)
TROPONIN: <0.01 NG/ML
WBC # BLD: 6.8 K/UL (ref 4–11)

## 2020-12-15 PROCEDURE — 85610 PROTHROMBIN TIME: CPT

## 2020-12-15 PROCEDURE — 6360000004 HC RX CONTRAST MEDICATION: Performed by: STUDENT IN AN ORGANIZED HEALTH CARE EDUCATION/TRAINING PROGRAM

## 2020-12-15 PROCEDURE — 84484 ASSAY OF TROPONIN QUANT: CPT

## 2020-12-15 PROCEDURE — 6370000000 HC RX 637 (ALT 250 FOR IP): Performed by: PHYSICIAN ASSISTANT

## 2020-12-15 PROCEDURE — 96375 TX/PRO/DX INJ NEW DRUG ADDON: CPT

## 2020-12-15 PROCEDURE — 80053 COMPREHEN METABOLIC PANEL: CPT

## 2020-12-15 PROCEDURE — 36415 COLL VENOUS BLD VENIPUNCTURE: CPT

## 2020-12-15 PROCEDURE — 70496 CT ANGIOGRAPHY HEAD: CPT

## 2020-12-15 PROCEDURE — 99285 EMERGENCY DEPT VISIT HI MDM: CPT

## 2020-12-15 PROCEDURE — 6360000002 HC RX W HCPCS: Performed by: PHYSICIAN ASSISTANT

## 2020-12-15 PROCEDURE — 85025 COMPLETE CBC W/AUTO DIFF WBC: CPT

## 2020-12-15 PROCEDURE — 71045 X-RAY EXAM CHEST 1 VIEW: CPT

## 2020-12-15 PROCEDURE — 96374 THER/PROPH/DIAG INJ IV PUSH: CPT

## 2020-12-15 PROCEDURE — 70450 CT HEAD/BRAIN W/O DYE: CPT

## 2020-12-15 PROCEDURE — G0378 HOSPITAL OBSERVATION PER HR: HCPCS

## 2020-12-15 RX ORDER — ASPIRIN 81 MG/1
81 TABLET ORAL DAILY
Status: DISCONTINUED | OUTPATIENT
Start: 2020-12-15 | End: 2020-12-16 | Stop reason: HOSPADM

## 2020-12-15 RX ORDER — SODIUM CHLORIDE 0.9 % (FLUSH) 0.9 %
10 SYRINGE (ML) INJECTION EVERY 12 HOURS SCHEDULED
Status: DISCONTINUED | OUTPATIENT
Start: 2020-12-15 | End: 2020-12-16 | Stop reason: HOSPADM

## 2020-12-15 RX ORDER — SODIUM CHLORIDE 0.9 % (FLUSH) 0.9 %
10 SYRINGE (ML) INJECTION PRN
Status: DISCONTINUED | OUTPATIENT
Start: 2020-12-15 | End: 2020-12-16 | Stop reason: HOSPADM

## 2020-12-15 RX ORDER — DIPHENHYDRAMINE HYDROCHLORIDE 50 MG/ML
25 INJECTION INTRAMUSCULAR; INTRAVENOUS ONCE
Status: COMPLETED | OUTPATIENT
Start: 2020-12-15 | End: 2020-12-15

## 2020-12-15 RX ORDER — ASPIRIN 300 MG/1
300 SUPPOSITORY RECTAL DAILY
Status: DISCONTINUED | OUTPATIENT
Start: 2020-12-15 | End: 2020-12-16 | Stop reason: HOSPADM

## 2020-12-15 RX ORDER — ASPIRIN 81 MG/1
324 TABLET, CHEWABLE ORAL ONCE
Status: COMPLETED | OUTPATIENT
Start: 2020-12-15 | End: 2020-12-15

## 2020-12-15 RX ORDER — ATORVASTATIN CALCIUM 40 MG/1
80 TABLET, FILM COATED ORAL NIGHTLY
Status: DISCONTINUED | OUTPATIENT
Start: 2020-12-16 | End: 2020-12-16 | Stop reason: HOSPADM

## 2020-12-15 RX ORDER — KETOROLAC TROMETHAMINE 30 MG/ML
15 INJECTION, SOLUTION INTRAMUSCULAR; INTRAVENOUS ONCE
Status: COMPLETED | OUTPATIENT
Start: 2020-12-15 | End: 2020-12-15

## 2020-12-15 RX ORDER — PROMETHAZINE HYDROCHLORIDE 25 MG/1
12.5 TABLET ORAL EVERY 6 HOURS PRN
Status: DISCONTINUED | OUTPATIENT
Start: 2020-12-15 | End: 2020-12-16 | Stop reason: HOSPADM

## 2020-12-15 RX ORDER — ONDANSETRON 2 MG/ML
4 INJECTION INTRAMUSCULAR; INTRAVENOUS EVERY 6 HOURS PRN
Status: DISCONTINUED | OUTPATIENT
Start: 2020-12-15 | End: 2020-12-16 | Stop reason: HOSPADM

## 2020-12-15 RX ORDER — METOCLOPRAMIDE HYDROCHLORIDE 5 MG/ML
10 INJECTION INTRAMUSCULAR; INTRAVENOUS ONCE
Status: COMPLETED | OUTPATIENT
Start: 2020-12-15 | End: 2020-12-15

## 2020-12-15 RX ORDER — POLYETHYLENE GLYCOL 3350 17 G/17G
17 POWDER, FOR SOLUTION ORAL DAILY PRN
Status: DISCONTINUED | OUTPATIENT
Start: 2020-12-15 | End: 2020-12-16 | Stop reason: HOSPADM

## 2020-12-15 RX ADMIN — ASPIRIN 324 MG: 81 TABLET, CHEWABLE ORAL at 11:29

## 2020-12-15 RX ADMIN — IOPAMIDOL 75 ML: 755 INJECTION, SOLUTION INTRAVENOUS at 09:57

## 2020-12-15 RX ADMIN — METOCLOPRAMIDE HYDROCHLORIDE 10 MG: 5 INJECTION INTRAMUSCULAR; INTRAVENOUS at 11:29

## 2020-12-15 RX ADMIN — DIPHENHYDRAMINE HYDROCHLORIDE 25 MG: 50 INJECTION, SOLUTION INTRAMUSCULAR; INTRAVENOUS at 11:30

## 2020-12-15 RX ADMIN — KETOROLAC TROMETHAMINE 15 MG: 30 INJECTION, SOLUTION INTRAMUSCULAR at 11:29

## 2020-12-15 ASSESSMENT — PAIN SCALES - GENERAL
PAINLEVEL_OUTOF10: 7
PAINLEVEL_OUTOF10: 7

## 2020-12-15 ASSESSMENT — ENCOUNTER SYMPTOMS
ABDOMINAL PAIN: 0
BACK PAIN: 0
NAUSEA: 0
VOMITING: 0
SORE THROAT: 0
SHORTNESS OF BREATH: 0

## 2020-12-15 ASSESSMENT — PAIN DESCRIPTION - LOCATION: LOCATION: HEAD

## 2020-12-15 ASSESSMENT — PAIN DESCRIPTION - PAIN TYPE: TYPE: ACUTE PAIN

## 2020-12-15 NOTE — ED PROVIDER NOTES
[facility]      Pt Name: Timbo Markham  MRN: [id number]  [place] 1985  Date of evaluation: 12/15/2020  Provider: Ayush Peace PA-C    This patient was seen and evaluated by the attending physician Dasia Lyons MD.    [address] [place] [address]       Chief Complaint   Patient presents with    Headache     Patient to ED with headache and states 30min PTA she had a sharp pain and numbness/tingling in her arms and legs. Patient states she is having left leg numbness and left leg numbness         CRITICAL CARE TIME   Total Critical Care time was 31 minutes, excluding separately reportable procedures. There was a high probability of clinically significant/life threatening deterioration in the patient's condition which required my urgent intervention. HISTORYOF PRESENT ILLNESS  (Location/Symptom, Timing/Onset, Context/Setting, Quality, Duration, Modifying Factors, Severity.)   Timbo Markham is a 38 y.o. female with a past medical history of hypertension, asthma, obesity, pulmonary emboli who presents to the emergency department with headache and left-sided numbness/weakness. The patient reports she has had some pressure in her head \"all over\" for the past 4 days but 30 minutes prior to her arrival today she developed a sharp pain in her head and simultaneously developed numbness to her left arm and leg and weakness in her left leg. The sharp pain has resolved but now she has a dull headache. She says that over the weekend she had some intermittent blurred vision but denies blurred vision at this time. She reports mild photophobia. She states she is having a tingling sensation in her left arm and leg. Denies any speech difficulty, problems with coordination or gait. She has a prior history of pulmonary embolism after a , she is no longer anticoagulated. She did recently test positive for Covid on .       Nursing Notes were reviewedand I agree. REVIEW OF SYSTEMS    (2-9 systems for level 4, 10 or more forlevel 5)     Review of Systems   Constitutional: Negative for chills and fever. HENT: Negative for sore throat. Respiratory: Negative for shortness of breath. Cardiovascular: Negative for chest pain. Gastrointestinal: Negative for abdominal pain, nausea and vomiting. Genitourinary: Negative for difficulty urinating and dysuria. Musculoskeletal: Negative for back pain. Skin: Negative for rash. Neurological: Positive for weakness, numbness and headaches. Negative for syncope, facial asymmetry, speech difficulty and light-headedness. Psychiatric/Behavioral: The patient is not nervous/anxious. All other systems reviewed and are negative. Except as noted above the remainder ofthe review of systems was reviewed and negative. PAST MEDICALHISTORY         Diagnosis Date    Asthma     dx'd at 21 yo, not well-controlled    Hypertension     with this pregnancy    Obesity     Pre-eclampsia     Pulmonary emboli ([facility] [address]        SURGICAL HISTORY           Procedure Laterality Date     SECTION      x 3    CHOLECYSTECTOMY, LAPAROSCOPIC  2018     Laparoscopic cholecystectomy with cholangiogram    UPPER GASTROINTESTINAL ENDOSCOPY N/A 2019    EGD DIAGNOSTIC ONLY performed by Shara Martines MD at [facility]       Previous Medications    ACETAMINOPHEN (APAP EXTRA STRENGTH) 500 MG TABLET    Take 1 tablet by mouth every 6 hours as needed for Pain    ALBUTEROL SULFATE HFA (PROVENTIL HFA) 108 (90 BASE) MCG/ACT INHALER    Inhale 2 puffs into the lungs every 4 hours as needed for Wheezing or Shortness of Breath (Space out to every 6 hours as symptoms improve) Space out to every 6 hours as symptoms improve.     ONDANSETRON (ZOFRAN ODT) 4 MG DISINTEGRATING TABLET    Take 1-2 tablets by mouth every 12 hours as needed for Nausea PREDNISONE (DELTASONE) 20 MG TABLET    Take 3 tablets daily for 3 days, then 2 tablets daily for 3 days, then 1 tablets daily for 3 days       ALLERGIES     Shellfish-derived products    FAMILY HISTORY           Problem Relation Age of Onset    High Blood Pressure Father     Diabetes Paternal Grandmother     High Blood Pressure Paternal Grandmother     Diabetes Paternal Grandfather     High Blood Pressure Paternal Grandfather     Asthma Mother     Cancer Maternal Grandmother      Family Status   Relation Name Status    Father  Alive    [address] [place] [address]  Alive    [facility]  Alive    Mother  Alive    [place]          SOCIAL HISTORY    reports that she has never smoked. She has never used smokeless tobacco. She reports that she does not drink alcohol or use drugs. PHYSICAL EXAM    (up to 7 for level 4, 8 or more for level 5)     ED Triage Vitals [12/15/20 0943]   BP Temp Temp src Pulse Resp SpO2 Height Weight   104/82 97.5 °F (36.4 °C) -- 94 20 100 % 5' 10\" (1.778 m) (!) 320 lb 8.8 oz (145.4 kg)       Physical Exam  Vitals signs and nursing note reviewed. Constitutional:       General: She is not in acute distress. Appearance: She is well-developed. HENT:      Head: Normocephalic and atraumatic. Eyes:      Extraocular Movements: Extraocular movements intact. Pupils: Pupils are equal, round, and reactive to light. Neck:      Musculoskeletal: Neck supple. Pulmonary:      Effort: Pulmonary effort is normal. No respiratory distress. Abdominal:      General: There is no distension. Musculoskeletal: Normal range of motion. Skin:     General: Skin is warm and dry. Neurological:      Mental Status: She is alert and oriented to person, place, and time. Sensory: Sensory deficit present. Motor: Weakness present.       Coordination: Coordination normal.      Comments: NIH stroke scale score of 4 based on left-sided facial numbness, left-sided arm numbness, left-sided leg numbness and left-sided leg weakness. Psychiatric:         Behavior: Behavior normal.            DIAGNOSTIC RESULTS     EKG:  Please see Dr. Vaibhav Crowe note for full interpretation. RADIOLOGY:   Non-plain film images such as CT, Ultrasound and MRI are read by the radiologist.Plain radiographic images are visualized and preliminarily interpreted by Sonya Alejo PA-C with the below findings:        Interpretation per the Radiologist below, if available at the time of this note:    XR CHEST PORTABLE   Final Result   No consolidation         CT HEAD WO CONTRAST   Final Result   Addendum 1 of 1   ADDENDUM:   These results were communicated by telephone to the ordering clinician at   10:15 a.m. on 12/15/2020         Final      CTA HEAD NECK W CONTRAST   Preliminary Result   Unremarkable CTA of the head and neck. LABS:  Labs Reviewed   POCT GLUCOSE - Normal   TROPONIN    Narrative:     Performed at:  [facility]  [address] [place], [place] [address]   Phone [phone]   PROTIME-INR    Narrative:     Performed at:  [address] [place] [address]  [address] [place], [place] [address]   Phone [phone]   CBC WITH AUTO DIFFERENTIAL    Narrative:     Performed at:  [address] [place] [address] Laboratory  [address] [place], [place] [address]   Phone [phone]   COMPREHENSIVE METABOLIC PANEL W/ REFLEX TO MG FOR LOW K    Narrative:     Performed at:  [facility]  [address] [place], [place] [address]   Phone [phone]   POCT GLUCOSE    Narrative:     Performed at:  [address] [place] [address]  [address] [place], [place] [address]   Phone [phone]       All other labs were within normal range or not returned as of this dictation.     EMERGENCY DEPARTMENT COURSE and DIFFERENTIAL DIAGNOSIS/MDM:   Vitals:    Vitals:    12/15/20 0935 12/15/20 0943 12/15/20 1038 12/15/20 1046   BP: 104/82 104/82 (!) 161/101 (!) 160/94   Pulse:  94 80    Resp:  20     Temp:  97.5 °F (36.4 °C)     SpO2: 100% 100%     Weight:  (!) 320 lb 8.8 oz (145.4 kg)     Height:  5' 10\" (1.778 m)          I have evaluated this patient. My supervising physician was available for consultation. The patient has an NIHSS score of 4. Code stroke called and she was immediately sent for noncontrast head CT and CTA head and neck. No bleed or LVO. Telemedicine evaluation with Dr. Melita Palumbo, please see her consult note. The patient was made aware of relative contraindication to tPA given nondisabling symptoms. She was able to discuss further with her father and ultimately declined tPA. Stroke team did recommend further stroke workup with Echo, MRI, etc. Therefore I have requested admission from the hospitalist team via [employer]. I did order aspirin, as well as migraine medications (Toradol, Reglan, Benadryl). PROCEDURES:  None    FINAL IMPRESSION      1. Stroke-like symptoms    2. Other complicated headache syndrome          DISPOSITION/PLAN   DISPOSITION Decision To Admit 12/15/2020 11:21:49 AM      PATIENT REFERRED TO:  No follow-up provider specified.     MEDICATIONS:  New Prescriptions    No medications on file       (Please note that portions of this note were completed with a voice recognition program.  Efforts were made toedit the dictations but occasionally words are mis-transcribed.)    RACIEL Davidson PA-C  12/15/20 8080

## 2020-12-15 NOTE — ED NOTES
Acknowledged pt by pt's name. Verified pt by name and date of birth. Checked arm band, allergies, reviewed past medical history. Introduced myself to patient  Duration of ED plan of care explained to patient  Explained planned tests and procedures  Thanked patient for coming to [facility].    Asked if there was anything else I could do for the patient before exiting room. CB in reach.      Wilma Siddiqui RN  12/15/20 1038

## 2020-12-15 NOTE — H&P
Hospital Medicine History & Physical      PCP: Campbell Gamboa MD    Date of Admission: 12/15/2020    Date of Service: Pt seen/examined on 12/15/2020 and Placed in Observation. Chief Complaint:  Headache, left hand and left foot numbness. History Of Present Illness: The patient is a 38 y.o. female w hx of provoked PE (after c section surgery), obesity, HTN (not on medications presently), recently diagnosed with COVID PNA, who presents to [facility] with headache. Pt reports she was diagnosed with COVID on Dec 6th - had cough, SOB and myalgias. Symptoms now resolving. Her PCP had a repeat COVID test done on Dec 12 - which was negative - and she stopped self isolation. She was out shopping at [place] today when she developed severe headache associated with left hand and foot numbness. She denies any motor deficit. She reports numbness as pins and needles sensation more consistent with paresthesia. Her headache has now resolved. Paresthesia in LUE and LLE persisting. Past Medical History:        Diagnosis Date    Asthma     dx'd at 21 yo, not well-controlled    Hypertension     with this pregnancy    Obesity     Pre-eclampsia     Pulmonary emboli (HCC)        Past Surgical History:        Procedure Laterality Date     SECTION      x 3    CHOLECYSTECTOMY, LAPAROSCOPIC  2018     Laparoscopic cholecystectomy with cholangiogram    UPPER GASTROINTESTINAL ENDOSCOPY N/A 2019    EGD DIAGNOSTIC ONLY performed by Alma Chauhan MD at [facility]         Medications Prior to Admission:    Prior to Admission medications    Medication Sig Start Date End Date Taking?  Authorizing Provider   albuterol sulfate HFA (PROVENTIL HFA) 108 (90 Base) MCG/ACT inhaler Inhale 2 puffs into the lungs every 4 hours as needed for Wheezing or Shortness of Breath (Space out to every 6 hours as symptoms improve) Space out to every 6 hours as symptoms improve. 12/6/20  Yes CANDELARIO Arthur   predniSONE (DELTASONE) 20 MG tablet Take 3 tablets daily for 3 days, then 2 tablets daily for 3 days, then 1 tablets daily for 3 days 12/6/20  Yes CANDELARIO Daigle   acetaminophen (APAP EXTRA STRENGTH) 500 MG tablet Take 1 tablet by mouth every 6 hours as needed for Pain 12/6/20  Yes CANDELARIO Arthur   ondansetron (ZOFRAN ODT) 4 MG disintegrating tablet Take 1-2 tablets by mouth every 12 hours as needed for Nausea 11/5/20  Yes Gabriel Bojorquez PA-C       Allergies:  Shellfish-derived products    Social History:  The patient currently lives at home. TOBACCO:   reports that she has never smoked. She has never used smokeless tobacco.  ETOH:   reports no history of alcohol use. Family History:  Reviewed in detail and negative for DM, Early CAD, Cancer, CVA. Positive as follows:        Problem Relation Age of Onset    High Blood Pressure Father     Diabetes Paternal Grandmother     High Blood Pressure Paternal Grandmother     Diabetes Paternal Grandfather     High Blood Pressure Paternal Grandfather     Asthma Mother     Cancer Maternal Grandmother        REVIEW OF SYSTEMS:   As noted in the HPI. All other systems reviewed and negative. PHYSICAL EXAM:    BP (!) 146/70   Pulse 70   Temp 97.5 °F (36.4 °C)   Resp 14   Ht 5' 10\" (1.778 m)   Wt (!) 320 lb 8.8 oz (145.4 kg)   SpO2 100%   BMI 45.99 kg/m²     General appearance: No apparent distress appears stated age and cooperative. HEENT Normal cephalic, atraumatic without obvious deformity. Pupils equal, round, and reactive to light. Extra ocular muscles intact. Conjunctivae/corneas clear. Neck: Supple, No jugular venous distention/bruits. Trachea midline without thyromegaly or adenopathy with full range of motion.   Lungs: Clear to auscultation, bilaterally without Rales/Wheezes/Rhonchi with good respiratory effort. Heart: Regular rate and rhythm with Normal S1/S2 without murmurs, rubs or gallops, point of maximum impulse non-displaced  Abdomen: Soft, non-tender or non-distended without rigidity or guarding and positive bowel sounds all four quadrants. Extremities: No clubbing, cyanosis, or edema bilaterally. Full range of motion without deformity and normal gait intact. Skin: Skin color, texture, turgor normal.  No rashes or lesions. Neurologic: Alert and oriented X 3, neurovascularly intact with sensory/motor intact upper extremities/lower extremities, bilaterally. Cranial nerves: II-XII intact, grossly non-focal.  Mental status: Alert, oriented, thought content appropriate.   Capillary Refill: Acceptable  < 3 seconds  Peripheral Pulses: +3 Easily felt, not easily obliterated with pressure        CBC   Recent Labs     12/15/20  0955   WBC 6.8   HGB 14.1   HCT 41.8         RENAL  Recent Labs     12/15/20  0955      K 4.5      CO2 25   BUN 8   CREATININE 0.7     LFT'S  Recent Labs     12/15/20  0955   AST 15   ALT 18   BILITOT <0.2   ALKPHOS 103     COAG  Recent Labs     12/15/20  0954   INR 0.95     CARDIAC ENZYMES  Recent Labs     12/15/20  0955   TROPONINI <0.01       U/A:    Lab Results   Component Value Date    COLORU YELLOW 11/05/2020    WBCUA 3 09/26/2019    RBCUA 3 09/26/2019    BACTERIA 1+ 09/26/2019    CLARITYU Clear 11/05/2020    SPECGRAV <1.005 11/05/2020    LEUKOCYTESUR Negative 11/05/2020    BLOODU Negative 11/05/2020    GLUCOSEU Negative 11/05/2020       ABG  No results found for: RCW8UCC, BEART, S5GBWZQC, PHART, THGBART, BFW6KFZ, PO2ART, GFO5VOB        Active Hospital Problems    Diagnosis Date Noted    Numbness and tingling in left hand [R20.0, R20.2] 12/15/2020         PHYSICIANS CERTIFICATION:    I certify that Antony Dallas is expected to be hospitalized for less than 2 midnights based on the following assessment and plan:      ASSESSMENT/PLAN:      Headache with Paresthesia in LUE and LLE - acute onset 12/15/2020  No motor deficit on exam.   ED discussed with  stroke team - pt elected against TPA. Plan:    - CVA order protocol.    - no indication for pt/ot or speech. - CTA head and neck - unremarkable. - started on ASA and statin pending MRI brain. - suspicion for acute CVA is low. - monitor in PCU on telemetry as per protocol. COVID Infection. Diagnosed 12/6  Symptoms resolving. CXR clear. Lab work normal.   Stable on RA. Repeat test on 12/12 was negative. Isolation per facility protocol. Lass likely contagious now. DVT Prophylaxis: lovenox  Diet: Diet NPO Effective Now - advance to general diet as tolerates. Code Status: full code. Dispo - PCU obs. Cristian Cerda MD    Thank you Frannie Davis MD for the opportunity to be involved in this patient's care. If you have any questions or concerns please feel free to contact me at [phone].

## 2020-12-15 NOTE — ED PROVIDER NOTES
MidLevel Attestation   I havepersonally performed and/or participated in the history, exam and medical decision making and agree with all pertinent clinical information. I have also reviewed and agree with the past medical, family and social historyunless otherwise noted. I have personally performed a face to face diagnostic evaluation onthis patient. I have reviewed the mid-levels findings and agree. In brief, Kate Carmona is a 38 y.o. female that presented to the emergency department with   Chief Complaint   Patient presents with    Headache     Patient to ED with headache and states 30min PTA she had a sharp pain and numbness/tingling in her arms and legs. Patient states she is having left leg numbness and left leg numbness   . CONSTITUTIONAL: Well appearing, in no acute distress   EYES: PERRL, No injection, discharge or scleral icterus. NECK: Normal ROM, NO LAD and Moist mucous membranes. CARDIOVASCULAR: Regular rate and rhythm. No murmurs or gallop. PULMONARY/CHEST: Airway patent. No retractions. Breath sounds clear with good air entry bilaterally. ABDOMEN: Soft, Non-distended and non-tender, without guarding or rebound. SKIN: Acyanotic, warm, dry   MUSCULOSKELETAL: No swelling, tenderness or deformity   NEUROLOGICAL: Awake. Pulses intact. Grossly nonfocal                                                                                                                     NIH Stroke Scale     Time: 3:16 PM   Person Administering Scale: Ismealiitobelen Mari MD     Level of consciousness: [0]   0 = Alert;   1 = Not alert;   2 = Not alert;   3 = Responds only with reflex motor or autonomic effects or totally unresponsive, flaccid, and flexic. LOC questions: [0]   0 = Answers both questions correctly. 1 = Answers one question correctly. 2 = Answers neither question correctly. LOC commands: [0]   0 = Performs both tasks correctly. 1 = Performs one task correctly.    2 = Performs were mild and the risk of bleed. Nonetheless she was admitted to the hospitalist service for further evaluation and treatment.     I have reviewed and interpreted all of the currently available lab results and diagnostics from this visit:  Results for orders placed or performed during the hospital encounter of 12/15/20   Troponin   Result Value Ref Range    Troponin <0.01 <0.01 ng/mL   Protime-INR   Result Value Ref Range    Protime 11.0 10.0 - 13.2 sec    INR 0.95 0.86 - 1.14   CBC Auto Differential   Result Value Ref Range    WBC 6.8 4.0 - 11.0 K/uL    RBC 4.81 4.00 - 5.20 M/uL    Hemoglobin 14.1 12.0 - 16.0 g/dL    Hematocrit 41.8 36.0 - 48.0 %    MCV 86.9 80.0 - 100.0 fL    MCH 29.4 26.0 - 34.0 pg    MCHC 33.8 31.0 - 36.0 g/dL    RDW 13.2 12.4 - 15.4 %    Platelets 454 450 - 267 K/uL    MPV 8.2 5.0 - 10.5 fL    Neutrophils % 61.7 %    Lymphocytes % 28.3 %    Monocytes % 6.0 %    Eosinophils % 2.4 %    Basophils % 1.6 %    Neutrophils Absolute 4.2 1.7 - 7.7 K/uL    Lymphocytes Absolute 1.9 1.0 - 5.1 K/uL    Monocytes Absolute 0.4 0.0 - 1.3 K/uL    Eosinophils Absolute 0.2 0.0 - 0.6 K/uL    Basophils Absolute 0.1 0.0 - 0.2 K/uL   Comprehensive Metabolic Panel w/ Reflex to MG   Result Value Ref Range    Sodium 139 136 - 145 mmol/L    Potassium reflex Magnesium 4.5 3.5 - 5.1 mmol/L    Chloride 102 99 - 110 mmol/L    CO2 25 21 - 32 mmol/L    Anion Gap 12 3 - 16    Glucose 99 70 - 99 mg/dL    BUN 8 7 - 20 mg/dL    CREATININE 0.7 0.6 - 1.1 mg/dL    GFR Non-African American >60 >60    GFR African American >60 >60    Calcium 9.4 8.3 - 10.6 mg/dL    Total Protein 6.8 6.4 - 8.2 g/dL    Alb 4.2 3.4 - 5.0 g/dL    Albumin/Globulin Ratio 1.6 1.1 - 2.2    Total Bilirubin <0.2 0.0 - 1.0 mg/dL    Alkaline Phosphatase 103 40 - 129 U/L    ALT 18 10 - 40 U/L    AST 15 15 - 37 U/L    Globulin 2.6 g/dL   POCT Glucose   Result Value Ref Range    Glucose 95 mg/dL    QC OK? ok    POCT Glucose   Result Value Ref Range    POC Glucose 95 70 - 99 mg/dl    [facility]      Ct Head Wo Contrast    Addendum Date: 12/15/2020    ADDENDUM: These results were communicated by telephone to the ordering clinician at 10:15 a.m. on 12/15/2020     Result Date: 12/15/2020  EXAMINATION: CT OF THE HEAD WITHOUT CONTRAST  12/15/2020 9:55 am TECHNIQUE: CT of the head was performed without the administration of intravenous contrast. Dose modulation, iterative reconstruction, and/or weight based adjustment of the mA/kV was utilized to reduce the radiation dose to as low as reasonably achievable. COMPARISON: None. HISTORY: ORDERING SYSTEM PROVIDED HISTORY: left face and left leg numbness with a headache TECHNOLOGIST PROVIDED HISTORY: Has a \"code stroke\" or \"stroke alert\" been called? ->Yes Reason for exam:->left face and left leg numbness with a headache Is the patient pregnant?->No Reason for Exam: headache, left leg and facial numbness Acuity: Acute Type of Exam: Initial FINDINGS: BRAIN/VENTRICLES: There is no acute intracranial hemorrhage, mass effect or midline shift. No abnormal extra-axial fluid collection. The gray-white differentiation is maintained without evidence of an acute infarct. There is no evidence of hydrocephalus. ORBITS: The visualized portion of the orbits demonstrate no acute abnormality. SINUSES: The visualized paranasal sinuses and mastoid air cells demonstrate no acute abnormality. SOFT TISSUES/SKULL:  No acute abnormality of the visualized skull or soft tissues. No acute intracranial abnormality. Xr Chest Portable    Result Date: 12/15/2020  EXAMINATION: ONE XRAY VIEW OF THE CHEST 12/15/2020 10:14 am COMPARISON: December 6, 2020 HISTORY: ORDERING SYSTEM PROVIDED HISTORY: stroke TECHNOLOGIST PROVIDED HISTORY: Reason for exam:->stroke Reason for Exam: stroke Acuity: Acute Type of Exam: Initial FINDINGS: The lungs are clear. The cardiac silhouette is unremarkable. There are no pleural effusions. There is no pneumothorax.      No consolidation     Cta Head Neck W Contrast    Result Date: 12/15/2020  EXAMINATION: CTA OF THE HEAD AND NECK WITH CONTRAST 12/15/2020 9:55 am: TECHNIQUE: CTA of the head and neck was performed with the administration of intravenous contrast. Multiplanar reformatted images are provided for review. MIP images are provided for review. Stenosis of the internal carotid arteries measured using NASCET criteria. Dose modulation, iterative reconstruction, and/or weight based adjustment of the mA/kV was utilized to reduce the radiation dose to as low as reasonably achievable. COMPARISON: Noncontrast CT head from earlier today. HISTORY: ORDERING SYSTEM PROVIDED HISTORY: headache, left leg and facial numbness TECHNOLOGIST PROVIDED HISTORY: Reason for exam:->headache, left leg and facial numbness Reason for Exam: headache, left leg and facial numbness Acuity: Acute Type of Exam: Initial FINDINGS: CTA NECK: AORTIC ARCH/ARCH VESSELS: No dissection or arterial injury. No significant stenosis of the brachiocephalic or subclavian arteries. CAROTID ARTERIES: No dissection, arterial injury, or hemodynamically significant stenosis by NASCET criteria. VERTEBRAL ARTERIES: No dissection, arterial injury, or significant stenosis. SOFT TISSUES: The lung apices are clear. No cervical or superior mediastinal lymphadenopathy. The larynx and pharynx are unremarkable. No acute abnormality of the salivary and thyroid glands. BONES: No acute osseous abnormality. CTA HEAD: ANTERIOR CIRCULATION: No significant stenosis of the intracranial internal carotid, anterior cerebral, or middle cerebral arteries. No aneurysm. POSTERIOR CIRCULATION: No significant stenosis of the vertebral, basilar, or posterior cerebral arteries. No aneurysm. OTHER: No dural venous sinus thrombosis on this non-dedicated study. BRAIN: No mass effect or midline shift. No extra-axial fluid collection. The gray-white differentiation is maintained.      Unremarkable CTA of the head and neck. ED Medication Orders (From admission, onward)    Start Ordered     Status Ordering Provider    12/15/20 1130 12/15/20 1121  aspirin chewable tablet 324 mg  ONCE      Last MAR action: Given - by Cammy Reyesutant on 12/15/20 at [facility]    12/15/20 1130 12/15/20 1121  ketorolac (TORADOL) injection 15 mg  ONCE      Last MAR action: Given - by Cammy Talaverant on 12/15/20 at [facility]    12/15/20 1130 12/15/20 1121  metoclopramide (REGLAN) injection 10 mg  ONCE      Last MAR action: Given - by Cammy Reyesutant on 12/15/20 at [address] [place] [address],[address]    12/15/20 1130 12/15/20 1121  diphenhydrAMINE (BENADRYL) injection 25 mg  ONCE      Last MAR action: Given - by Cammy Talaverant on 12/15/20 at [facility]    12/15/20 0955 12/15/20 0955  iopamidol (ISOVUE-370) 76 % injection 75 mL  IMG ONCE PRN      Last MAR action: Given - by Bob Peter on 12/15/20 at [address] [facility]          Final Impression      1. Stroke-like symptoms    2.  Other complicated headache syndrome        DISPOSITION Admitted 12/15/2020 01:12:43 PM       Amount and/or Complexity of Data Reviewed:  Clinical lab tests: ordered and reviewed   Tests in the radiology section of CPT®: ordered and reviewed   Tests in the medicine section of CPT®: ordered and reviewed   Decide to obtain previous medical records or to obtain history from someone other than the patient: no  Obtain history from someone other than the patient: no  Review and summarize past medical records:yes  I looked up the patient in our electronic medical record:yes  Discuss the patient with other providers:yes  Independent visualization of images, tracings, or specimens:yes  Risk of Complications, Morbidity, and/or Mortality:Moderate  Presenting problems: moderate Diagnostic procedures: moderate yes Management options: moderate     Critical Care Attestation   Total critical care time: 35 minutes minimum   Critical care

## 2020-12-15 NOTE — CONSULTS
Stroke Team Consult Note    Patient Name:  Jeff Quach  :  1985    Date of Encounter:  12/15/2020  Stroke Team Paged:  09:46 AM 12/15/2020    Patient is a 37 yo F with PMHx of HTN, obesity, and asthma who presents with headache x 4 days (onset ) and sudden onset of L-sided numbness/tingling in face, LUE, and LLE at approximately 9:15 am.  Patient does have history of few migraine headaches but not frequent, doesn't require any migraine preventatives, doesn't have history of complicated migraines. Decision to discuss tPA via telemedicine. Imagin/15/2020 CT head w/o contrast:  Relatively unremarkable, healthy brain with appropriate volume for age, good grey-white differentiation, no clear hypodensities or hyperdense vessels, no intracranial hemorrhage  12/15/2020 CTA head/neck:  Vessels somewhat narrow diffusely though appear patent without any specific areas of greater stenosis or occlusion. Per radiology, unremarkable. Saw patient via telemedicine. Reports L facial tingling that is mild, LUE tingling with some clumsiness but no drift or ataxia noted on FNF and KELLE.  LLE noted to have some tingling at the toes. Face appears symmetric, speech is appropriate, no gaze preference noted. Patient denies other deficits, does not feel current deficits are disabling. Long discussion regarding risk vs benefit of tPA, feel that there is a relative contraindication due to concern for non-disabling deficits currently as well as possibility of stroke mimic in 37 yo F. Also discussed that if this is a stroke, it is likely lacunar given no LVO and presentation with hemisensory symptoms and the prognosis for lacunar infarct at her age would be very good with the expectation that she will rehab well from these deficits. Ultimate decision from patient not to pursue tPA, which I think is appropriate.     A/P:  -No tPA due to non-disabling symptoms, possible stroke mimic  -No thrombectomy due to no LVO  -Would check lipid panel, start statin if LDL > 70  -Check Hgb A1c, goal for stroke prevention is <7%  -Start  mg qd   -If stroke is found on MRI would meet criteria for POINT protocol with clopidogrel 600 mg x 1 followed by 21 d of clopidogrel 75 mg qd    -If no stroke on MRI, does not meet criteria for POINT protocol based on ABCD2 score of 2 and should have ASA monotherapy  -Would check MRI Brain w/o contrast to confirm or rule out stroke  -TTE w/ bubble contrast to eval for cardioembolic source  -Lifestyle counseling regarding diet, exercise, abstaining from tobacco to reduce risk of stroke  -PT, OT, SLP as appropriate based on deficits    Please call with any further questions or concerns.     Gaby Titus MD  PGY5, Vascular Neurology  Direct Number:  [phone]  Attending:  Dr. Meme Alberto

## 2020-12-16 ENCOUNTER — APPOINTMENT (OUTPATIENT)
Dept: MRI IMAGING | Age: 35
End: 2020-12-16
Payer: COMMERCIAL

## 2020-12-16 VITALS
BODY MASS INDEX: 41.95 KG/M2 | WEIGHT: 293 LBS | DIASTOLIC BLOOD PRESSURE: 104 MMHG | SYSTOLIC BLOOD PRESSURE: 145 MMHG | TEMPERATURE: 98.4 F | OXYGEN SATURATION: 99 % | HEIGHT: 70 IN | RESPIRATION RATE: 18 BRPM | HEART RATE: 91 BPM

## 2020-12-16 LAB
CHOLESTEROL, TOTAL: 198 MG/DL (ref 0–199)
ESTIMATED AVERAGE GLUCOSE: 119.8 MG/DL
HBA1C MFR BLD: 5.8 %
HCT VFR BLD CALC: 38.9 % (ref 36–48)
HDLC SERPL-MCNC: 41 MG/DL (ref 40–60)
HEMOGLOBIN: 13 G/DL (ref 12–16)
LDL CHOLESTEROL CALCULATED: 121 MG/DL
MCH RBC QN AUTO: 29.3 PG (ref 26–34)
MCHC RBC AUTO-ENTMCNC: 33.3 G/DL (ref 31–36)
MCV RBC AUTO: 87.9 FL (ref 80–100)
PDW BLD-RTO: 13.1 % (ref 12.4–15.4)
PLATELET # BLD: 379 K/UL (ref 135–450)
PMV BLD AUTO: 8.1 FL (ref 5–10.5)
RBC # BLD: 4.43 M/UL (ref 4–5.2)
TRIGL SERPL-MCNC: 179 MG/DL (ref 0–150)
VLDLC SERPL CALC-MCNC: 36 MG/DL
WBC # BLD: 5.9 K/UL (ref 4–11)

## 2020-12-16 PROCEDURE — 94760 N-INVAS EAR/PLS OXIMETRY 1: CPT

## 2020-12-16 PROCEDURE — 6370000000 HC RX 637 (ALT 250 FOR IP): Performed by: INTERNAL MEDICINE

## 2020-12-16 PROCEDURE — 6360000002 HC RX W HCPCS: Performed by: EMERGENCY MEDICINE

## 2020-12-16 PROCEDURE — 70551 MRI BRAIN STEM W/O DYE: CPT

## 2020-12-16 PROCEDURE — 96372 THER/PROPH/DIAG INJ SC/IM: CPT

## 2020-12-16 PROCEDURE — 2580000003 HC RX 258: Performed by: INTERNAL MEDICINE

## 2020-12-16 PROCEDURE — G0378 HOSPITAL OBSERVATION PER HR: HCPCS

## 2020-12-16 PROCEDURE — 83036 HEMOGLOBIN GLYCOSYLATED A1C: CPT

## 2020-12-16 PROCEDURE — 36415 COLL VENOUS BLD VENIPUNCTURE: CPT

## 2020-12-16 PROCEDURE — 80061 LIPID PANEL: CPT

## 2020-12-16 PROCEDURE — 96375 TX/PRO/DX INJ NEW DRUG ADDON: CPT

## 2020-12-16 PROCEDURE — 6360000002 HC RX W HCPCS: Performed by: INTERNAL MEDICINE

## 2020-12-16 PROCEDURE — 85027 COMPLETE CBC AUTOMATED: CPT

## 2020-12-16 RX ORDER — PROCHLORPERAZINE EDISYLATE 5 MG/ML
10 INJECTION INTRAMUSCULAR; INTRAVENOUS ONCE
Status: COMPLETED | OUTPATIENT
Start: 2020-12-16 | End: 2020-12-16

## 2020-12-16 RX ORDER — IBUPROFEN 400 MG/1
400 TABLET ORAL EVERY 6 HOURS PRN
Status: DISCONTINUED | OUTPATIENT
Start: 2020-12-16 | End: 2020-12-16 | Stop reason: HOSPADM

## 2020-12-16 RX ORDER — KETOROLAC TROMETHAMINE 15 MG/ML
15 INJECTION, SOLUTION INTRAMUSCULAR; INTRAVENOUS EVERY 6 HOURS PRN
Status: DISCONTINUED | OUTPATIENT
Start: 2020-12-16 | End: 2020-12-16 | Stop reason: HOSPADM

## 2020-12-16 RX ORDER — LORAZEPAM 2 MG/ML
1 INJECTION INTRAMUSCULAR ONCE
Status: COMPLETED | OUTPATIENT
Start: 2020-12-16 | End: 2020-12-16

## 2020-12-16 RX ORDER — ACETAMINOPHEN 325 MG/1
650 TABLET ORAL EVERY 8 HOURS PRN
Status: DISCONTINUED | OUTPATIENT
Start: 2020-12-16 | End: 2020-12-16 | Stop reason: HOSPADM

## 2020-12-16 RX ADMIN — PROCHLORPERAZINE EDISYLATE 10 MG: 5 INJECTION INTRAMUSCULAR; INTRAVENOUS at 01:14

## 2020-12-16 RX ADMIN — Medication 10 ML: at 08:50

## 2020-12-16 RX ADMIN — ASPIRIN 81 MG: 81 TABLET, COATED ORAL at 08:49

## 2020-12-16 RX ADMIN — ACETAMINOPHEN 650 MG: 325 TABLET ORAL at 12:03

## 2020-12-16 RX ADMIN — LORAZEPAM 1 MG: 2 INJECTION INTRAMUSCULAR; INTRAVENOUS at 01:14

## 2020-12-16 RX ADMIN — ENOXAPARIN SODIUM 40 MG: 40 INJECTION SUBCUTANEOUS at 08:49

## 2020-12-16 ASSESSMENT — PAIN DESCRIPTION - DESCRIPTORS: DESCRIPTORS: HEADACHE

## 2020-12-16 ASSESSMENT — PAIN DESCRIPTION - PROGRESSION: CLINICAL_PROGRESSION: NOT CHANGED

## 2020-12-16 ASSESSMENT — PAIN DESCRIPTION - FREQUENCY: FREQUENCY: CONTINUOUS

## 2020-12-16 ASSESSMENT — PAIN DESCRIPTION - LOCATION: LOCATION: HEAD

## 2020-12-16 ASSESSMENT — PAIN SCALES - GENERAL
PAINLEVEL_OUTOF10: 0
PAINLEVEL_OUTOF10: 6
PAINLEVEL_OUTOF10: 0
PAINLEVEL_OUTOF10: 7

## 2020-12-16 ASSESSMENT — PAIN DESCRIPTION - PAIN TYPE: TYPE: ACUTE PAIN

## 2020-12-16 ASSESSMENT — PAIN - FUNCTIONAL ASSESSMENT: PAIN_FUNCTIONAL_ASSESSMENT: ACTIVITIES ARE NOT PREVENTED

## 2020-12-16 ASSESSMENT — PAIN DESCRIPTION - ONSET: ONSET: ON-GOING

## 2020-12-16 NOTE — PLAN OF CARE
Problem: HEMODYNAMIC STATUS  Goal: Patient has stable vital signs and fluid balance  12/16/2020 1236 by Jules Anderson RN  Outcome: Ongoing  Note:       Problem: ACTIVITY INTOLERANCE/IMPAIRED MOBILITY  Goal: Mobility/activity is maintained at optimum level for patient  12/16/2020 1236 by Jules Anderson RN  Outcome: Ongoing  Note:

## 2020-12-16 NOTE — ED NOTES
Pt updated on plan of care, states her headache is returning, also updated on plan of care and aware admit room is clean and ready     Mis Byrd RN  12/15/20 1952

## 2020-12-16 NOTE — ED NOTES
Per Care Everywhere - Patient was tested with a Covid PCR on 12/12/2020, and resulted NEGATIVE 12/14/2020. Test was done within a [facility] facility.          Shekhar Rubin RN  12/15/20 5467

## 2020-12-16 NOTE — ED NOTES
Pt requesting something for headache, pt aware that message sent to admitting doctor. Pt resting in the dark awaiting admission bed.        Brooke Merino RN  12/15/20 4660

## 2020-12-16 NOTE — PLAN OF CARE
Problem: COMMUNICATION IMPAIRMENT  Goal: Ability to express needs and understand communication  Outcome: Ongoing     Problem: HEMODYNAMIC STATUS  Goal: Patient has stable vital signs and fluid balance  Outcome: Ongoing

## 2020-12-16 NOTE — PLAN OF CARE
In an effort to reduce exposure to staff and other patients, all requests for advanced imaging such as MRI must be approved by a radiologist. Please have the ordering physician call for a [id number] review by calling [phone]. Thank you!

## 2020-12-16 NOTE — PROGRESS NOTES
4 Eyes Skin Assessment     NAME:  Wilma Waddell  YOB: 1985  MEDICAL RECORD NUMBER:  [id number]    The patient is being assess for  Admission    I agree that 2 RN's have performed a thorough Head to Toe Skin Assessment on the patient. ALL assessment sites listed below have been assessed. Areas assessed by both nurses:    Head, Face, Ears, Shoulders, Back, Chest, Arms, Elbows, Hands, Sacrum. Buttock, Coccyx, Ischium and Legs. Feet and Heels        Does the Patient have a Wound?  No noted wound(s)       Scot Prevention initiated:  No   Wound Care Orders initiated:  No    Pressure Injury (Stage 3,4, Unstageable, DTI, NWPT, and Complex wounds) if present place consult order under [de-identified] No    New and Established Ostomies if present place consult order under : No      Nurse 1 eSignature: Electronically signed by Kim Marshall RN on 12/16/20 at 3:47 AM EST    **SHARE this note so that the co-signing nurse is able to place an eSignature**    Nurse 2 eSignature: Electronically signed by Cristian Del Rosario RN on 12/16/20 at 3:55 AM EST
Iv and tele d/cd. Discharge instructions reviewed with pt, verbalized understanding. Denies any needs or questions.  Belongings gathered per pt. walked with pt out to elevators and to drive self home  Electronically signed by Juan Martinez RN on 12/16/2020 at 3:16 PM
Medication Reconciliation    List of medications patient is currently taking is complete. Source of information: 1. Conversation with patient                                       2. EPIC records      Allergies  Shellfish-derived products     Notes regarding home medications:   1. Patient denies regular use of any prescription/OTC medications. Patient is just finishing up a Prednisone course.     Maxwell Bryan, PharmD, BCPS  12/15/2020 11:54 AM
emphasized . The notebook also provides education on Stroke community resources and stroke advocacy. The need for follow-up after discharge was highlighted with patient/family with them being able to repeat understanding of the importance of this.       Electronically signed by Tanya Vázquez RN on 12/16/2020 at 3:46 AM

## 2020-12-16 NOTE — ED NOTES
Report from Bernardo, [address] [place] [address].       Ryan Flores RN  12/16/20 4540

## 2020-12-16 NOTE — DISCHARGE SUMMARY
Hospital Medicine Discharge Summary    Patient ID: Antione Zamorano      Patient's PCP: Shanti Brennan MD    Admit Date: 12/15/2020     Discharge Date:   12/16/2020    Admitting Physician: Bismark Jackson MD     Discharge Physician: Bismark Jackson MD     Discharge Diagnoses: Active Hospital Problems    Diagnosis    Numbness and tingling in left hand [R20.0, R20.2]       The patient was seen and examined on day of discharge and this discharge summary is in conjunction with any daily progress note from day of discharge. Hospital Course: The patient is a 38 y.o. female w hx of provoked PE (after c section surgery), obesity, HTN (not on medications presently), recently diagnosed with COVID PNA, who presents to [facility] with headache.      Pt reports she was diagnosed with COVID on Dec 6th - had cough, SOB and myalgias. Symptoms now resolving. Her PCP had a repeat COVID test done on Dec 12 - which was negative - and she stopped self isolation.      She was out shopping at [employer] today when she developed severe headache associated with left hand and foot numbness.      She denies any motor deficit. She reports numbness as pins and needles sensation more consistent with paresthesia.           Headache with Paresthesia in LUE and LLE - acute onset 12/15/2020  No motor deficit on exam.   ED discussed with  stroke team - pt elected against TPA. Plan:               - CVA order protocol.               - no indication for pt/ot or speech. - CTA head and neck - unremarkable. - started on ASA and statin pending MRI brain.    - MRI brain - no acute findings. - suspicion for acute CVA is very low. - monitored in PCU on telemetry as per protocol - no acute events.          COVID Infection. Diagnosed 12/6  Symptoms resolving. CXR clear. Lab work normal.   Stable on RA. Repeat COVID test on 12/12 was negative.    Isolation per facility protocol. Less likely contagious now. Pt is stable for discharge today. Physical Exam Performed:     BP (!) 145/104   Pulse 91   Temp 98.4 °F (36.9 °C) (Oral)   Resp 18   Ht 5' 10\" (1.778 m)   Wt (!) 320 lb 1.7 oz (145.2 kg)   SpO2 99%   BMI 45.93 kg/m²       General appearance:  No apparent distress, appears stated age and cooperative. HEENT:  Normal cephalic, atraumatic without obvious deformity. Pupils equal, round, and reactive to light. Extra ocular muscles intact. Conjunctivae/corneas clear. Neck: Supple, with full range of motion. No jugular venous distention. Trachea midline. Respiratory:  Normal respiratory effort. Clear to auscultation, bilaterally without Rales/Wheezes/Rhonchi. Cardiovascular:  Regular rate and rhythm with normal S1/S2 without murmurs, rubs or gallops. Abdomen: Soft, non-tender, non-distended with normal bowel sounds. Musculoskeletal:  No clubbing, cyanosis or edema bilaterally. Full range of motion without deformity. Skin: Skin color, texture, turgor normal.  No rashes or lesions. Neurologic:  Neurovascularly intact without any focal sensory/motor deficits. Cranial nerves: II-XII intact, grossly non-focal.  Psychiatric:  Alert and oriented, thought content appropriate, normal insight  Capillary Refill: Brisk,< 3 seconds   Peripheral Pulses: +2 palpable, equal bilaterally       Labs: For convenience and continuity at follow-up the following most recent labs are provided:      CBC:    Lab Results   Component Value Date    WBC 5.9 12/16/2020    HGB 13.0 12/16/2020    HCT 38.9 12/16/2020     12/16/2020       Renal:    Lab Results   Component Value Date     12/15/2020    K 4.5 12/15/2020     12/15/2020    CO2 25 12/15/2020    BUN 8 12/15/2020    CREATININE 0.7 12/15/2020    CALCIUM 9.4 12/15/2020    PHOS 4.0 12/30/2019         Significant Diagnostic Studies    Radiology:   MRI BRAIN WO CONTRAST   Final Result   1.  No acute

## 2020-12-18 ENCOUNTER — CARE COORDINATION (OUTPATIENT)
Dept: CARE COORDINATION | Age: 35
End: 2020-12-18

## 2020-12-23 ENCOUNTER — CARE COORDINATION (OUTPATIENT)
Dept: CARE COORDINATION | Age: 35
End: 2020-12-23

## 2021-01-22 ENCOUNTER — HOSPITAL ENCOUNTER (EMERGENCY)
Age: 36
Discharge: HOME | End: 2021-01-22
Payer: COMMERCIAL

## 2021-01-22 VITALS
HEIGHT: 70 IN | SYSTOLIC BLOOD PRESSURE: 157 MMHG | OXYGEN SATURATION: 100 % | DIASTOLIC BLOOD PRESSURE: 95 MMHG | WEIGHT: 293 LBS | RESPIRATION RATE: 18 BRPM | BODY MASS INDEX: 41.95 KG/M2 | TEMPERATURE: 98.7 F | HEART RATE: 81 BPM

## 2021-01-22 DIAGNOSIS — M25.562 ACUTE PAIN OF LEFT KNEE: Primary | ICD-10-CM

## 2021-01-22 PROCEDURE — 99283 EMERGENCY DEPT VISIT LOW MDM: CPT

## 2021-01-22 PROCEDURE — 93005 ELECTROCARDIOGRAM TRACING: CPT | Performed by: EMERGENCY MEDICINE

## 2021-01-22 RX ORDER — PANTOPRAZOLE SODIUM 40 MG/1
40 TABLET, DELAYED RELEASE ORAL DAILY
COMMUNITY

## 2021-01-22 ASSESSMENT — ENCOUNTER SYMPTOMS
COUGH: 0
COLOR CHANGE: 0
BACK PAIN: 0
SHORTNESS OF BREATH: 0
GASTROINTESTINAL NEGATIVE: 1

## 2021-01-22 ASSESSMENT — PAIN DESCRIPTION - LOCATION: LOCATION: LEG

## 2021-01-22 ASSESSMENT — PAIN SCALES - GENERAL: PAINLEVEL_OUTOF10: 8

## 2021-01-23 NOTE — ED PROVIDER NOTES
ATTENDING EKG INTERPRETATION    The Ekg interpreted by me shows  normal sinus rhythm with a rate of 72 bpm.  Axis is   Normal  QTc is  normal  Intervals and Durations are unremarkable. ST Segments: normal, R wave present in lead V2, unlikely anterior infarct contrary to machine read. No significant change from prior EKG dated December 6, 2020.         Natasha Tony MD  01/23/21 6223 EGD done at bedside. Pt already on sedation drips (propofol and fentanyl). Tolerated well. Vitals stable. Will continue to monitor.

## 2021-01-23 NOTE — ED PROVIDER NOTES
**ADVANCED PRACTICE PROVIDER, I HAVE EVALUATED THIS PATIENT**        [address]      Pt Name: Chuckie Fabian  WX  [place] 1985  Date of evaluation: 2021  Provider: OLIVERIO Curry CNP      Chief Complaint:    Chief Complaint   Patient presents with    Leg Pain     left leg pain, most pain behind the knee, 8/10, x 1 week but got worse today, NKI    Arm Pain     left arm pain x 1 week, nki, 3/10    Dizziness     had a period of dizziness today that lasted about an hour       Nursing Notes, Past Medical Hx, Past Surgical Hx, Social Hx, Allergies, and Family Hx were all reviewed and agreed with or any disagreements were addressed in the HPI.    HPI:  (Location, Duration, Timing, Severity, Quality, Assoc Sx, Context, Modifying factors)  This is a  38 y.o. female who presents to the emergency department today complaining of pain behind left knee. Onset was 1 week ago. Symptoms started spontaneously and have been intermittent. Worse with certain movements and ambulation and weightbearing. She denies any swelling or skin changes in her left calf. She does have a history of PE after she had a , and she is concerned about a blood clot. She denies shortness of breath. 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     PastMedical/Surgical History:      Diagnosis Date    Asthma     dx'd at 23 yo, not well-controlled    Hypertension     with this pregnancy    Obesity     Pre-eclampsia     Pulmonary emboli ([address]          Procedure Laterality Date     SECTION      x 3    CHOLECYSTECTOMY, LAPAROSCOPIC  2018     Laparoscopic cholecystectomy with cholangiogram    UPPER GASTROINTESTINAL ENDOSCOPY N/A 2019    EGD DIAGNOSTIC ONLY performed by Jorge Garcia MD at [address] [place] [address] TOOTH EXTRACTION         Medications:  Discharge Medication List as of 1/22/2021 11:37 PM      CONTINUE these medications which have NOT CHANGED    Details   pantoprazole (PROTONIX) 40 MG tablet Take 40 mg by mouth dailyHistorical Med      albuterol sulfate HFA (PROVENTIL HFA) 108 (90 Base) MCG/ACT inhaler Inhale 2 puffs into the lungs every 4 hours as needed for Wheezing or Shortness of Breath (Space out to every 6 hours as symptoms improve) Space out to every 6 hours as symptoms improve., Disp-1 Inhaler,R-0Print               Review of Systems:  Review of Systems   Constitutional: Negative for chills, diaphoresis, fatigue and fever. HENT: Negative. Respiratory: Negative for cough and shortness of breath. Cardiovascular: Negative for chest pain. Gastrointestinal: Negative. Musculoskeletal: Positive for arthralgias (left knee). Negative for back pain, gait problem, joint swelling, myalgias, neck pain and neck stiffness. Skin: Negative for color change, pallor and rash. Allergic/Immunologic: Negative for immunocompromised state. Neurological: Negative for dizziness, syncope, weakness, light-headedness, numbness and headaches. Psychiatric/Behavioral: Negative for confusion. All other systems reviewed and are negative. Positives and Pertinent negatives as per HPI. Except as noted above in the ROS, problem specific ROS was completed and is negative. Physical Exam:  Physical Exam  Vitals signs and nursing note reviewed. Constitutional:       General: She is not in acute distress. Appearance: Normal appearance. She is well-developed. She is not toxic-appearing. HENT:      Head: Normocephalic and atraumatic. Eyes:      General: No scleral icterus. Extraocular Movements: Extraocular movements intact. Conjunctiva/sclera: Conjunctivae normal.      Pupils: Pupils are equal, round, and reactive to light.       Comments: No skew deviation   Neck:      Musculoskeletal: Full passive range of motion without pain and neck supple. No neck rigidity. Vascular: No JVD. Cardiovascular:      Rate and Rhythm: Normal rate and regular rhythm. Heart sounds: Normal heart sounds. Pulmonary:      Effort: Pulmonary effort is normal. No respiratory distress. Breath sounds: Normal breath sounds. Abdominal:      General: There is no distension. Palpations: Abdomen is soft. Abdomen is not rigid. Tenderness: There is no abdominal tenderness. Musculoskeletal: Normal range of motion. Left knee: She exhibits normal range of motion, no swelling, no deformity and no erythema. Tenderness found. Left lower leg: She exhibits no tenderness and no swelling. Legs:    Skin:     General: Skin is warm and dry. Capillary Refill: Capillary refill takes less than 2 seconds. Findings: No rash. Neurological:      Comments: NIH 0 with no focal deficits. Patient is awake, alert & oriented x4 and speaking in complete sentences without dysphasia or slurring of their words, CN II-XII grossly intact, good coordination with normal finger-to-nose and heel-to-shin test, 5/5 motor strength in all 4 extremities, sensation to light touch intact bilaterally, patellar and achilles reflexes 2+ and equal bilaterally, gait is normal without ataxia, no truncal ataxia. Psychiatric:         Mood and Affect: Mood normal.         MEDICAL DECISION MAKING    Vitals:    Vitals:    01/22/21 2249   BP: (!) 157/95   Pulse: 81   Resp: 18   Temp: 98.7 °F (37.1 °C)   TempSrc: Oral   SpO2: 100%   Weight: (!) 320 lb 1.7 oz (145.2 kg)   Height: 5' 10\" (1.778 m)       LABS:Labs Reviewed - No data to display     Remainder of labs reviewed and werenegative at this time or not returned at the time of this note.     RADIOLOGY:   Non-plain film images such as CT, Ultrasound and MRI are read by the radiologist. OLIVERIO Raymond CNP have directly visualized the radiologic plain film image(s) with the below findings:        Interpretation per the Radiologist below, if available at the time of this note:    VL Extremity Venous Left    (Results Pending)        No results found. MEDICAL DECISION MAKING / ED COURSE:      PROCEDURES:   Procedures    None    Patient was given:  Medications - No data to display    Differential diagnosis: includes but not limited to Arterial Injury/Ischemia, Fracture, Dislocation, Infection, Compartment Syndrome, Neurologic Deficit/Injury. Patient seen and examined today for left knee pain. See HPI for patient presentation. Patient is hemodynamically stable, nontoxic, afebrile, and without tachycardia, tachypnea, and hypoxia. Physical exam as above. 76-year-old lying in bed in no acute distress. She is awake alert and oriented with no neurovascular deficits. NIH of 0. She has some reproducible tenderness to the posterior left knee. There is no swelling or skin changes noted to the left knee. She has full ROM. I cannot reproduce any tenderness of the left calf and again she has no swelling when compared to the right she has no skin changes. Distal extremity is pink and well perfused. Lungs are CTA bilaterally cardio exam is normal.    I offered patient blood work but she did not want to have it done. I feel that this is very reasonable. It is much more likely that she has a Baker's cyst versus a DVT. She has a well score of 0. She also is not tachycardic, SOB, or hypoxic and I have no suspicion for PE. Offered patient an order to have an outpatient Doppler of the left extremity and she would like to have this done. Again she does not want blood work at today's visit which I feel is reasonable. She has no neurovascular deficits again. I gave her strict return precautions and discharged home in stable condition. At this time, the evidence for any other entities in the differential is insufficient to justify any further testing.   This was explained to the patient. The patient was advised that persistent or worsening symptoms will require further evaluation. I discussed with Radha Purdy and/or family the exam results, diagnosis, care, prognosis, reasons to return and the importance of follow up. Patient and/or family is in full agreement with plan and all questions have been answered. Specific discharge instructions explained, including reasons to return to the emergency department. Radha Purdy is well appearing, non-toxic, and afebrile at the time of discharge. Patient was instructed to follow up with primary care provider in 24-48 hours, and to instructed to return to ED immediately for any new or worsening concerns. Radha Purdy verbalized understanding and discharged home. The patient tolerated their visit well. I evaluated the patient. The physician was available for consultation as needed. The patient and / or the family were informed of the results of any tests, a time was given to answer questions, a plan was proposed and they agreed with plan. CLINICAL IMPRESSION:  1.  Acute pain of left knee        DISPOSITION Decision To Discharge 01/22/2021 11:19:41 PM      PATIENT REFERRED TO:  MD [address], [place]  [address] [facility] [phone]    Schedule an appointment as soon as possible for a visit       [address]  [address]  [phone]    Schedule an appointment as soon as possible for a visit       [facility] Emergency Department  [address] [facility] [id number]  [phone]  Go to   As needed      DISCHARGE MEDICATIONS:  Discharge Medication List as of 1/22/2021 11:37 PM          DISCONTINUED MEDICATIONS:  Discharge Medication List as of 1/22/2021 11:37 PM      STOP taking these medications       acetaminophen (APAP EXTRA STRENGTH) 500 MG tablet Comments:   Reason for Stopping:         ondansetron (ZOFRAN ODT) 4 MG disintegrating tablet Comments:   Reason for Stopping:                      (Please note the MDM and HPI sections of this note were completed with a voice recognition program.  Efforts were made to edit the dictations but occasionally words are mis-transcribed.)    Electronically signed, OLIVERIO Chavez CNP,          OLIVERIO Chavez CNP  01/22/21 0158

## 2021-01-23 NOTE — ED NOTES
.Pt discharged at this time. Discharge instructions  reviewed,  Questions were answered. PT verbalized understanding. Follow up appointments were discussed.          [address]  01/22/21 7382

## 2021-01-25 LAB
EKG ATRIAL RATE: 72 BPM
EKG DIAGNOSIS: NORMAL
EKG P AXIS: 59 DEGREES
EKG P-R INTERVAL: 164 MS
EKG Q-T INTERVAL: 386 MS
EKG QRS DURATION: 92 MS
EKG QTC CALCULATION (BAZETT): 422 MS
EKG R AXIS: 69 DEGREES
EKG T AXIS: 38 DEGREES
EKG VENTRICULAR RATE: 72 BPM

## 2021-01-25 PROCEDURE — 93010 ELECTROCARDIOGRAM REPORT: CPT | Performed by: INTERNAL MEDICINE

## 2021-10-24 ENCOUNTER — APPOINTMENT (OUTPATIENT)
Dept: GENERAL RADIOLOGY | Age: 36
End: 2021-10-24

## 2021-10-24 ENCOUNTER — HOSPITAL ENCOUNTER (EMERGENCY)
Age: 36
Discharge: HOME | End: 2021-10-24

## 2021-10-24 VITALS
HEART RATE: 82 BPM | HEIGHT: 70 IN | SYSTOLIC BLOOD PRESSURE: 168 MMHG | RESPIRATION RATE: 15 BRPM | DIASTOLIC BLOOD PRESSURE: 93 MMHG | BODY MASS INDEX: 41.95 KG/M2 | TEMPERATURE: 97.4 F | OXYGEN SATURATION: 98 % | WEIGHT: 293 LBS

## 2021-10-24 DIAGNOSIS — R05.3 PERSISTENT COUGH: Primary | ICD-10-CM

## 2021-10-24 PROCEDURE — 93005 ELECTROCARDIOGRAM TRACING: CPT | Performed by: EMERGENCY MEDICINE

## 2021-10-24 PROCEDURE — 71046 X-RAY EXAM CHEST 2 VIEWS: CPT

## 2021-10-24 PROCEDURE — 99283 EMERGENCY DEPT VISIT LOW MDM: CPT

## 2021-10-24 RX ORDER — PREDNISONE 10 MG/1
40 TABLET ORAL DAILY
Qty: 20 TABLET | Refills: 0 | Status: SHIPPED | OUTPATIENT
Start: 2021-10-24 | End: 2021-10-29

## 2021-10-24 RX ORDER — DEXTROMETHORPHAN POLISTIREX 30 MG/5ML
60 SUSPENSION ORAL EVERY 12 HOURS PRN
Qty: 220 ML | Refills: 0 | Status: SHIPPED | OUTPATIENT
Start: 2021-10-24 | End: 2021-11-03

## 2021-10-24 ASSESSMENT — ENCOUNTER SYMPTOMS
BACK PAIN: 0
FACIAL SWELLING: 0
NAUSEA: 0
COUGH: 1
CHOKING: 0
ABDOMINAL PAIN: 0
SORE THROAT: 0
APNEA: 0
VOMITING: 0
SHORTNESS OF BREATH: 0
EYE REDNESS: 0
EYE DISCHARGE: 0

## 2021-10-24 ASSESSMENT — PAIN SCALES - GENERAL: PAINLEVEL_OUTOF10: 8

## 2021-10-24 ASSESSMENT — PAIN DESCRIPTION - DESCRIPTORS: DESCRIPTORS: TIGHTNESS

## 2021-10-24 ASSESSMENT — PAIN DESCRIPTION - PAIN TYPE: TYPE: ACUTE PAIN

## 2021-10-24 ASSESSMENT — PAIN DESCRIPTION - FREQUENCY: FREQUENCY: INTERMITTENT

## 2021-10-24 ASSESSMENT — PAIN DESCRIPTION - LOCATION: LOCATION: CHEST
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
## 2021-10-25 LAB
EKG ATRIAL RATE: 72 BPM
EKG DIAGNOSIS: NORMAL
EKG P AXIS: 37 DEGREES
EKG P-R INTERVAL: 174 MS
EKG Q-T INTERVAL: 388 MS
EKG QRS DURATION: 88 MS
EKG QTC CALCULATION (BAZETT): 424 MS
EKG R AXIS: 44 DEGREES
EKG T AXIS: 37 DEGREES
EKG VENTRICULAR RATE: 72 BPM

## 2021-10-25 PROCEDURE — 93010 ELECTROCARDIOGRAM REPORT: CPT | Performed by: INTERNAL MEDICINE

## 2021-12-21 VITALS
BODY MASS INDEX: 48.65 KG/M2 | DIASTOLIC BLOOD PRESSURE: 98 MMHG | OXYGEN SATURATION: 99 % | TEMPERATURE: 97.6 F | RESPIRATION RATE: 20 BRPM | HEART RATE: 92 BPM | WEIGHT: 293 LBS | SYSTOLIC BLOOD PRESSURE: 138 MMHG

## 2021-12-21 PROCEDURE — 93005 ELECTROCARDIOGRAM TRACING: CPT

## 2021-12-21 PROCEDURE — 4500000002 HC ER NO CHARGE

## 2021-12-21 PROCEDURE — 87635 SARS-COV-2 COVID-19 AMP PRB: CPT

## 2021-12-21 ASSESSMENT — PAIN SCALES - GENERAL: PAINLEVEL_OUTOF10: 0

## 2021-12-22 ENCOUNTER — APPOINTMENT (OUTPATIENT)
Dept: GENERAL RADIOLOGY | Age: 36
End: 2021-12-22

## 2021-12-22 ENCOUNTER — HOSPITAL ENCOUNTER (EMERGENCY)
Age: 36
Discharge: LEFT BEFORE DISCHARGE | End: 2021-12-22

## 2021-12-22 LAB — SARS-COV-2, NAAT: NOT DETECTED

## 2021-12-22 PROCEDURE — 71046 X-RAY EXAM CHEST 2 VIEWS: CPT

## 2021-12-23 LAB
EKG ATRIAL RATE: 80 BPM
EKG DIAGNOSIS: NORMAL
EKG P AXIS: 65 DEGREES
EKG P-R INTERVAL: 170 MS
EKG Q-T INTERVAL: 366 MS
EKG QRS DURATION: 86 MS
EKG QTC CALCULATION (BAZETT): 422 MS
EKG R AXIS: 75 DEGREES
EKG T AXIS: 67 DEGREES
EKG VENTRICULAR RATE: 80 BPM

## 2022-05-27 ENCOUNTER — APPOINTMENT (OUTPATIENT)
Dept: CT IMAGING | Age: 37
End: 2022-05-27
Payer: COMMERCIAL

## 2022-05-27 ENCOUNTER — APPOINTMENT (OUTPATIENT)
Dept: GENERAL RADIOLOGY | Age: 37
End: 2022-05-27
Payer: COMMERCIAL

## 2022-05-27 ENCOUNTER — HOSPITAL ENCOUNTER (OUTPATIENT)
Age: 37
Setting detail: OBSERVATION
Discharge: HOME | End: 2022-05-28
Attending: EMERGENCY MEDICINE | Admitting: HOSPITALIST
Payer: COMMERCIAL

## 2022-05-27 DIAGNOSIS — R06.09 DOE (DYSPNEA ON EXERTION): ICD-10-CM

## 2022-05-27 DIAGNOSIS — R07.9 CHEST PAIN, UNSPECIFIED TYPE: Primary | ICD-10-CM

## 2022-05-27 DIAGNOSIS — R55 SYNCOPE AND COLLAPSE: ICD-10-CM

## 2022-05-27 LAB
ALBUMIN SERPL-MCNC: 4.1 G/DL (ref 3.4–5)
ALP BLD-CCNC: 103 U/L (ref 40–129)
ALT SERPL-CCNC: 17 U/L (ref 10–40)
ANION GAP SERPL CALCULATED.3IONS-SCNC: 13 MMOL/L (ref 3–16)
AST SERPL-CCNC: 19 U/L (ref 15–37)
BACTERIA: ABNORMAL /HPF
BASOPHILS ABSOLUTE: 0 K/UL (ref 0–0.2)
BASOPHILS RELATIVE PERCENT: 1 %
BILIRUB SERPL-MCNC: <0.2 MG/DL (ref 0–1)
BILIRUBIN DIRECT: <0.2 MG/DL (ref 0–0.3)
BILIRUBIN URINE: NEGATIVE
BILIRUBIN, INDIRECT: NORMAL MG/DL (ref 0–1)
BLOOD, URINE: NEGATIVE
BUN BLDV-MCNC: 6 MG/DL (ref 7–20)
CALCIUM SERPL-MCNC: 8.9 MG/DL (ref 8.3–10.6)
CHLORIDE BLD-SCNC: 102 MMOL/L (ref 99–110)
CLARITY: ABNORMAL
CO2: 22 MMOL/L (ref 21–32)
COLOR: YELLOW
CREAT SERPL-MCNC: 0.7 MG/DL (ref 0.6–1.1)
D DIMER: 0.79 UG/ML FEU (ref 0–0.6)
EKG ATRIAL RATE: 84 BPM
EKG DIAGNOSIS: NORMAL
EKG P AXIS: 44 DEGREES
EKG P-R INTERVAL: 186 MS
EKG Q-T INTERVAL: 366 MS
EKG QRS DURATION: 74 MS
EKG QTC CALCULATION (BAZETT): 432 MS
EKG R AXIS: 48 DEGREES
EKG T AXIS: 51 DEGREES
EKG VENTRICULAR RATE: 84 BPM
EOSINOPHILS ABSOLUTE: 0.1 K/UL (ref 0–0.6)
EOSINOPHILS RELATIVE PERCENT: 2.4 %
EPITHELIAL CELLS, UA: 18 /HPF (ref 0–5)
GFR AFRICAN AMERICAN: >60
GFR NON-AFRICAN AMERICAN: >60
GLUCOSE BLD-MCNC: 103 MG/DL (ref 70–99)
GLUCOSE BLD-MCNC: 104 MG/DL (ref 70–99)
GLUCOSE URINE: NEGATIVE MG/DL
HCG QUALITATIVE: NEGATIVE
HCT VFR BLD CALC: 41 % (ref 36–48)
HEMOGLOBIN: 13.7 G/DL (ref 12–16)
HYALINE CASTS: 1 /LPF (ref 0–8)
KETONES, URINE: NEGATIVE MG/DL
LEUKOCYTE ESTERASE, URINE: NEGATIVE
LYMPHOCYTES ABSOLUTE: 1.3 K/UL (ref 1–5.1)
LYMPHOCYTES RELATIVE PERCENT: 29.5 %
MCH RBC QN AUTO: 29.6 PG (ref 26–34)
MCHC RBC AUTO-ENTMCNC: 33.4 G/DL (ref 31–36)
MCV RBC AUTO: 88.5 FL (ref 80–100)
MICROSCOPIC EXAMINATION: YES
MONOCYTES ABSOLUTE: 0.3 K/UL (ref 0–1.3)
MONOCYTES RELATIVE PERCENT: 6.3 %
NEUTROPHILS ABSOLUTE: 2.7 K/UL (ref 1.7–7.7)
NEUTROPHILS RELATIVE PERCENT: 60.8 %
NITRITE, URINE: NEGATIVE
PDW BLD-RTO: 13.2 % (ref 12.4–15.4)
PERFORMED ON: ABNORMAL
PH UA: 5 (ref 5–8)
PLATELET # BLD: 309 K/UL (ref 135–450)
PMV BLD AUTO: 8.7 FL (ref 5–10.5)
POTASSIUM REFLEX MAGNESIUM: 3.9 MMOL/L (ref 3.5–5.1)
PROTEIN UA: ABNORMAL MG/DL
RBC # BLD: 4.63 M/UL (ref 4–5.2)
RBC UA: 2 /HPF (ref 0–4)
SODIUM BLD-SCNC: 137 MMOL/L (ref 136–145)
SPECIFIC GRAVITY UA: 1.02 (ref 1–1.03)
TOTAL PROTEIN: 7 G/DL (ref 6.4–8.2)
TROPONIN: <0.01 NG/ML
TSH SERPL DL<=0.05 MIU/L-ACNC: 2.32 UIU/ML (ref 0.27–4.2)
URINE REFLEX TO CULTURE: ABNORMAL
URINE TYPE: ABNORMAL
UROBILINOGEN, URINE: 0.2 E.U./DL
WBC # BLD: 4.4 K/UL (ref 4–11)
WBC UA: ABNORMAL /HPF (ref 0–5)

## 2022-05-27 PROCEDURE — 6360000004 HC RX CONTRAST MEDICATION: Performed by: PHYSICIAN ASSISTANT

## 2022-05-27 PROCEDURE — 36415 COLL VENOUS BLD VENIPUNCTURE: CPT

## 2022-05-27 PROCEDURE — 71046 X-RAY EXAM CHEST 2 VIEWS: CPT

## 2022-05-27 PROCEDURE — 80076 HEPATIC FUNCTION PANEL: CPT

## 2022-05-27 PROCEDURE — 71260 CT THORAX DX C+: CPT

## 2022-05-27 PROCEDURE — 81001 URINALYSIS AUTO W/SCOPE: CPT

## 2022-05-27 PROCEDURE — G0378 HOSPITAL OBSERVATION PER HR: HCPCS

## 2022-05-27 PROCEDURE — 84443 ASSAY THYROID STIM HORMONE: CPT

## 2022-05-27 PROCEDURE — 2580000003 HC RX 258: Performed by: HOSPITALIST

## 2022-05-27 PROCEDURE — 93010 ELECTROCARDIOGRAM REPORT: CPT | Performed by: INTERNAL MEDICINE

## 2022-05-27 PROCEDURE — 93017 CV STRESS TEST TRACING ONLY: CPT

## 2022-05-27 PROCEDURE — 93005 ELECTROCARDIOGRAM TRACING: CPT | Performed by: PHYSICIAN ASSISTANT

## 2022-05-27 PROCEDURE — 84484 ASSAY OF TROPONIN QUANT: CPT

## 2022-05-27 PROCEDURE — 84703 CHORIONIC GONADOTROPIN ASSAY: CPT

## 2022-05-27 PROCEDURE — 6360000002 HC RX W HCPCS: Performed by: PHYSICIAN ASSISTANT

## 2022-05-27 PROCEDURE — 99285 EMERGENCY DEPT VISIT HI MDM: CPT

## 2022-05-27 PROCEDURE — 85025 COMPLETE CBC W/AUTO DIFF WBC: CPT

## 2022-05-27 PROCEDURE — 99203 OFFICE O/P NEW LOW 30 MIN: CPT | Performed by: INTERNAL MEDICINE

## 2022-05-27 PROCEDURE — 6370000000 HC RX 637 (ALT 250 FOR IP): Performed by: PHYSICIAN ASSISTANT

## 2022-05-27 PROCEDURE — 85379 FIBRIN DEGRADATION QUANT: CPT

## 2022-05-27 PROCEDURE — 6370000000 HC RX 637 (ALT 250 FOR IP): Performed by: HOSPITALIST

## 2022-05-27 PROCEDURE — 96374 THER/PROPH/DIAG INJ IV PUSH: CPT

## 2022-05-27 PROCEDURE — 80048 BASIC METABOLIC PNL TOTAL CA: CPT

## 2022-05-27 RX ORDER — POLYETHYLENE GLYCOL 3350 17 G/17G
17 POWDER, FOR SOLUTION ORAL DAILY PRN
Status: DISCONTINUED | OUTPATIENT
Start: 2022-05-27 | End: 2022-05-28 | Stop reason: HOSPADM

## 2022-05-27 RX ORDER — ONDANSETRON 4 MG/1
4 TABLET, ORALLY DISINTEGRATING ORAL EVERY 8 HOURS PRN
Status: DISCONTINUED | OUTPATIENT
Start: 2022-05-27 | End: 2022-05-28 | Stop reason: HOSPADM

## 2022-05-27 RX ORDER — OXYCODONE HYDROCHLORIDE AND ACETAMINOPHEN 5; 325 MG/1; MG/1
1 TABLET ORAL EVERY 4 HOURS PRN
Status: DISCONTINUED | OUTPATIENT
Start: 2022-05-27 | End: 2022-05-28 | Stop reason: HOSPADM

## 2022-05-27 RX ORDER — ACETAMINOPHEN 325 MG/1
650 TABLET ORAL EVERY 6 HOURS PRN
Status: DISCONTINUED | OUTPATIENT
Start: 2022-05-27 | End: 2022-05-28 | Stop reason: HOSPADM

## 2022-05-27 RX ORDER — ACETAMINOPHEN 325 MG/1
650 TABLET ORAL ONCE
Status: COMPLETED | OUTPATIENT
Start: 2022-05-27 | End: 2022-05-27

## 2022-05-27 RX ORDER — SODIUM CHLORIDE 0.9 % (FLUSH) 0.9 %
5-40 SYRINGE (ML) INJECTION EVERY 12 HOURS SCHEDULED
Status: DISCONTINUED | OUTPATIENT
Start: 2022-05-27 | End: 2022-05-28 | Stop reason: HOSPADM

## 2022-05-27 RX ORDER — ASPIRIN 325 MG
325 TABLET ORAL ONCE
Status: COMPLETED | OUTPATIENT
Start: 2022-05-27 | End: 2022-05-27

## 2022-05-27 RX ORDER — PANTOPRAZOLE SODIUM 40 MG/1
40 TABLET, DELAYED RELEASE ORAL DAILY
Status: DISCONTINUED | OUTPATIENT
Start: 2022-05-27 | End: 2022-05-28 | Stop reason: HOSPADM

## 2022-05-27 RX ORDER — SODIUM CHLORIDE 0.9 % (FLUSH) 0.9 %
5-40 SYRINGE (ML) INJECTION PRN
Status: DISCONTINUED | OUTPATIENT
Start: 2022-05-27 | End: 2022-05-28 | Stop reason: HOSPADM

## 2022-05-27 RX ORDER — ALBUTEROL SULFATE 90 UG/1
2 AEROSOL, METERED RESPIRATORY (INHALATION) EVERY 4 HOURS PRN
Status: DISCONTINUED | OUTPATIENT
Start: 2022-05-27 | End: 2022-05-28 | Stop reason: HOSPADM

## 2022-05-27 RX ORDER — ACETAMINOPHEN 650 MG/1
650 SUPPOSITORY RECTAL EVERY 6 HOURS PRN
Status: DISCONTINUED | OUTPATIENT
Start: 2022-05-27 | End: 2022-05-28 | Stop reason: HOSPADM

## 2022-05-27 RX ORDER — ONDANSETRON 2 MG/ML
4 INJECTION INTRAMUSCULAR; INTRAVENOUS EVERY 6 HOURS PRN
Status: DISCONTINUED | OUTPATIENT
Start: 2022-05-27 | End: 2022-05-28 | Stop reason: HOSPADM

## 2022-05-27 RX ORDER — SODIUM CHLORIDE 9 MG/ML
INJECTION, SOLUTION INTRAVENOUS PRN
Status: DISCONTINUED | OUTPATIENT
Start: 2022-05-27 | End: 2022-05-28 | Stop reason: HOSPADM

## 2022-05-27 RX ORDER — ENOXAPARIN SODIUM 100 MG/ML
40 INJECTION SUBCUTANEOUS 2 TIMES DAILY
Status: DISCONTINUED | OUTPATIENT
Start: 2022-05-27 | End: 2022-05-28 | Stop reason: HOSPADM

## 2022-05-27 RX ORDER — KETOROLAC TROMETHAMINE 30 MG/ML
15 INJECTION, SOLUTION INTRAMUSCULAR; INTRAVENOUS ONCE
Status: COMPLETED | OUTPATIENT
Start: 2022-05-27 | End: 2022-05-27

## 2022-05-27 RX ORDER — SODIUM CHLORIDE 9 MG/ML
INJECTION, SOLUTION INTRAVENOUS CONTINUOUS
Status: DISCONTINUED | OUTPATIENT
Start: 2022-05-27 | End: 2022-05-28 | Stop reason: HOSPADM

## 2022-05-27 RX ADMIN — ACETAMINOPHEN 650 MG: 325 TABLET ORAL at 13:25

## 2022-05-27 RX ADMIN — SODIUM CHLORIDE: 9 INJECTION, SOLUTION INTRAVENOUS at 16:37

## 2022-05-27 RX ADMIN — OXYCODONE AND ACETAMINOPHEN 1 TABLET: 5; 325 TABLET ORAL at 20:09

## 2022-05-27 RX ADMIN — KETOROLAC TROMETHAMINE 15 MG: 30 INJECTION, SOLUTION INTRAMUSCULAR at 13:25

## 2022-05-27 RX ADMIN — PANTOPRAZOLE SODIUM 40 MG: 40 TABLET, DELAYED RELEASE ORAL at 17:48

## 2022-05-27 RX ADMIN — ACETAMINOPHEN 650 MG: 325 TABLET ORAL at 07:47

## 2022-05-27 RX ADMIN — ACETAMINOPHEN 650 MG: 325 TABLET ORAL at 17:48

## 2022-05-27 RX ADMIN — ASPIRIN 325 MG ORAL TABLET 325 MG: 325 PILL ORAL at 11:00

## 2022-05-27 RX ADMIN — IOPAMIDOL 75 ML: 755 INJECTION, SOLUTION INTRAVENOUS at 09:16

## 2022-05-27 ASSESSMENT — PAIN DESCRIPTION - LOCATION
LOCATION: HEAD
LOCATION: CHEST
LOCATION: HEAD
LOCATION: HEAD

## 2022-05-27 ASSESSMENT — PAIN - FUNCTIONAL ASSESSMENT: PAIN_FUNCTIONAL_ASSESSMENT: 0-10

## 2022-05-27 ASSESSMENT — PAIN DESCRIPTION - DESCRIPTORS
DESCRIPTORS: SHARP
DESCRIPTORS: POUNDING

## 2022-05-27 ASSESSMENT — HEART SCORE
ECG: 0
ECG: 0

## 2022-05-27 ASSESSMENT — ENCOUNTER SYMPTOMS
CHEST TIGHTNESS: 1
COUGH: 0
SHORTNESS OF BREATH: 1
DIARRHEA: 0
NAUSEA: 0
VOMITING: 0
ABDOMINAL PAIN: 0
PHOTOPHOBIA: 0

## 2022-05-27 ASSESSMENT — PAIN SCALES - GENERAL
PAINLEVEL_OUTOF10: 6
PAINLEVEL_OUTOF10: 8
PAINLEVEL_OUTOF10: 7
PAINLEVEL_OUTOF10: 8

## 2022-05-27 ASSESSMENT — PAIN DESCRIPTION - FREQUENCY: FREQUENCY: CONTINUOUS

## 2022-05-27 ASSESSMENT — PAIN DESCRIPTION - PAIN TYPE: TYPE: ACUTE PAIN

## 2022-05-27 NOTE — CONSULTS
Cardiology Consultation     [facility]  1985    PCP: Celsa Gray MD  Referring Physician: Dr. Brando Graham  Reason for Referral: chest pain   Chief Complaint:   Chief Complaint   Patient presents with    Chest Pain     pt with chest pain that started ~ 2 this morning with shortness of breath, that seems to be getting worse. pt has had a PE in the past s/p        Subjective:     History of Present Illness: The patient is 44 y.o. female with a past medical history significant for HTN who presents with the above complaint. Episode of nonexertional chest pain. NO associated symptoms. No specific alleviating or exacerbating factors. Currently denies chest pain, PND, orthopnea, dyspnea at rest, palpitations, syncope or edema.        Past Medical History:   Diagnosis Date    Asthma     dx'd at 23 yo, not well-controlled    Hypertension     with this pregnancy    Obesity     Pre-eclampsia     Pulmonary emboli ([address]      Past Surgical History:   Procedure Laterality Date     SECTION      x 3    CHOLECYSTECTOMY, LAPAROSCOPIC  2018     Laparoscopic cholecystectomy with cholangiogram    UPPER GASTROINTESTINAL ENDOSCOPY N/A 2019    EGD DIAGNOSTIC ONLY performed by To Goel MD at [facility]       Family History   Problem Relation Age of Onset    High Blood Pressure Father     Diabetes Paternal Grandmother     High Blood Pressure Paternal Grandmother     Diabetes Paternal Grandfather     High Blood Pressure Paternal Grandfather     Asthma Mother     Cancer Maternal Grandmother      Social History     Tobacco Use    Smoking status: Never Smoker    Smokeless tobacco: Never Used   Vaping Use    Vaping Use: Never used   Substance Use Topics    Alcohol use: No     Comment: rarely    Drug use: No       Allergies   Allergen Reactions    Shellfish-Derived Products Swelling     No current facility-administered medications for this encounter. Current Outpatient Medications   Medication Sig Dispense Refill    metoprolol succinate (TOPROL XL) 50 MG extended release tablet Take 1 tablet by mouth daily 30 tablet 0    butalbital-acetaminophen-caffeine (FIORICET, ESGIC) -40 MG per tablet Take 1 tablet by mouth every 4 hours as needed for Headaches 30 tablet 1    pantoprazole (PROTONIX) 40 MG tablet Take 40 mg by mouth daily      albuterol sulfate HFA (PROVENTIL HFA) 108 (90 Base) MCG/ACT inhaler Inhale 2 puffs into the lungs every 4 hours as needed for Wheezing or Shortness of Breath (Space out to every 6 hours as symptoms improve) Space out to every 6 hours as symptoms improve. 1 Inhaler 0       Review of Systems:  · Constitutional: No unanticipated weight loss. There's been no change in energy level, sleep pattern, or activity level. No fevers, chills. · Eyes: No visual changes or diplopia. No scleral icterus. · ENT: No Headaches, hearing loss or vertigo. No mouth sores or sore throat. · Cardiovascular: as reviewed in HPI  · Respiratory: No cough or wheezing, no sputum production. No hemoptysis. · Gastrointestinal: No abdominal pain, appetite loss, blood in stools. No change in bowel or bladder habits. · Genitourinary: No dysuria, trouble voiding, or hematuria. · Musculoskeletal:  No gait disturbance, no joint complaints. · Integumentary: No rash or pruritis. · Neurological: No headache, diplopia, change in muscle strength, numbness or tingling. · Psychiatric: No anxiety or depression. · Endocrine: No temperature intolerance. No excessive thirst, fluid intake, or urination. No tremor. · Hematologic/Lymphatic: No abnormal bruising or bleeding, blood clots or swollen lymph nodes. · Allergic/Immunologic: No nasal congestion or hives.     Physical Exam:   BP (!) 144/82   Pulse 71   Temp 98.2 °F (36.8 °C) (Oral)   Resp 16   Wt (!) 347 lb 3.6 oz (157.5 kg)   LMP  (LMP Unknown)   SpO2 97%   BMI 49.82 kg/m² Wt Readings from Last 3 Encounters:   05/28/22 (!) 347 lb 3.6 oz (157.5 kg)   12/21/21 (!) 339 lb 1.1 oz (153.8 kg)   10/24/21 (!) 328 lb 14.8 oz (149.2 kg)     Constitutional: She is oriented to person, place, and time. She appears well-developed and well-nourished. In no acute distress. Head: Normocephalic and atraumatic. Pupils equal and round. Neck: Neck supple. No JVP or carotid bruit appreciated. No mass and no thyromegaly present. No lymphadenopathy present. Cardiovascular: Normal rate. Normal heart sounds. Exam reveals no gallop and no friction rub. No murmur heard. Pulmonary/Chest: Effort normal and breath sounds normal. No respiratory distress. She has no wheezes, rhonchi or rales. Abdominal: Soft, non-tender. Bowel sounds are normal. She exhibits no organomegaly, mass or bruit. Extremities: No edema. No cyanosis or clubbing. Pulses are 2+ radial/carotid bilaterally. Neurological: No gross cranial nerve deficit. Coordination normal.   Skin: Skin is warm and dry. There is no rash or diaphoresis. Psychiatric: She has a normal mood and affect. Her speech is normal and behavior is normal.     Lab Review:   FLP:    Lab Results   Component Value Date/Time    TRIG 179 12/16/2020 04:30 AM    HDL 41 12/16/2020 04:30 AM    LDLCALC 121 12/16/2020 04:30 AM    LABVLDL 36 12/16/2020 04:30 AM     BUN/Creatinine:    Lab Results   Component Value Date/Time    BUN 6 05/27/2022 07:46 AM    CREATININE 0.7 05/27/2022 07:46 AM     PT/INR, TNI, HGB A1C:   Lab Results   Component Value Date/Time    TROPONINI <0.01 05/28/2022 12:09 AM    LABA1C 5.8 12/16/2020 04:30 AM      No results found for: CBCAUTODIF    EKG Interpretation: Normal sinus, normal ECG   Echo:     Stress Test:         Conclusions      Summary   Normal exercise stress EKG. Uncontrolled hypertension.          Cath:     CT:    Doppler:     All above diagnostic testing and laboratory data was independently visualized and reviewed by me (not simply review of report)       Assessment and Plan   1) chest pain   - ACS ruled out   - stress negative   No further inpatient cardiac testing and can be d/c home    2) essential HTN   - continue medical therapy   - outpatient ambulatory monitoring         Thank you very much for allowing me to participate in the care of your patient. Please do not hesitate to contact me if you have any questions.       Domingo Denny MD [address] [place] [address], Interventional Cardiology, and Peripheral Vascular Disease   [facility]   Ph: [phone]  Fax: [phone]

## 2022-05-27 NOTE — PROGRESS NOTES
Pt arrived to room 4264. Pt oriented to room. VSS. Alert and oriented. Bed in lowest position, gripper socks on, call light within reach.

## 2022-05-27 NOTE — ED NOTES
ED SBAR report provider to [facility], [address] [facility]. Patient to be transported to Room 4264 via stretcher by transport tech. Patient transported with bedside cardiac monitor. IV site clean, dry, and intact. MEWS score and pain assessed as 6 and documented. Updated patient on plan of care.        Delmer Mi RN  05/27/22 9625

## 2022-05-27 NOTE — PROGRESS NOTES
Medication Reconciliation    List of medications patient is currently taking is complete. Source of information: 1. Conversation with patien at bedside                                      2. EPIC records           Notes regarding home medications:   1. Patient has been out of pantoprazole for about a week.          Trupti Nuñez [facility], PharmD, 5/27/2022 1:25 PM

## 2022-05-27 NOTE — PROGRESS NOTES
Seen and examined  Atypical chest pain   ECG normal   Exercise stress today  Full consult to follow    Nikki Irizarry MD [facility], Interventional Cardiology, and Peripheral Vascular [address]   (O): [phone]  (F): [phone]

## 2022-05-27 NOTE — ED PROVIDER NOTES
I independently examined and evaluated Richardean Eisenmenger. In brief, is a 34-year-old female presents to the emergency department for evaluation of chest pain. Patient reports he woke up this morning with substernal to left-sided chest pain. Says the pain is under the left breast.  Says she had pain for several hours so came into the emergency department to be evaluated. Reports having a similar pain about a week ago that radiated down the left arm. Focused exam revealed clinically nontoxic appearing female, answering questions appropriately, hemodynamically stable, and satting well on room air. Although heart score less than 4, patient reports having recurrent, exertional chest pain associated with shortness of breath over the past week. Cardiology was consulted. She was sent over for a stress test to be performed today. I responded to a CODE BLUE in stress lab. I found the patient crying and on the floor. She was answering questions. She had palpable pulses and heart sounds RRR. Patient had syncopized according to nursing staff. Patient brought back to the emergency department. Will admit patient for further work up. Serial troponin ordered and negative. The Ekg interpreted by me shows  Normal sinus rhythm with a rate of 84  Axis is normal  QTc is normal  Intervals and Durations are unremarkable. ST Segments: Nonspecific changes    All diagnostic, treatment, and disposition decisions were made by myself in conjunction with the advanced practice provider. I personally saw the patient and performed a substantive portion of the visit including aspects of the medical decision making. I personally saw the patient and independently provided 20 minutes of non-concurrent critical care out of the total shared critical care time provided.     Comment: Please note this report has been produced using speech recognition software and may contain errors related to that system including errors in grammar, punctuation, and spelling, as well as words and phrases that may be inappropriate. If there are any questions or concerns please feel free to contact the dictating provider for clarification. For all further details of the patient's emergency department visit, please see the advanced practice provider's documentation.          Kailash Flores MD  05/28/22 7583

## 2022-05-27 NOTE — ED PROVIDER NOTES
[address] [facility]        Pt Name: Brooke Gillespie  MRN: [id number]  [place] 1985  Date of evaluation: 2022  Provider: CANDELARIO Sheehan  PCP: Swapna Cabral MD  Note Started: 7:21 AM EDT        I have seen and evaluated this patient with my supervising physician Kailash Flores MD.    [address] [place] [address]       Chief Complaint   Patient presents with    Chest Pain     pt with chest pain that started ~ 2 this morning with shortness of breath, that seems to be getting worse. pt has had a PE in the past s/p        HISTORY OF PRESENT ILLNESS   (Location, Timing/Onset, Context/Setting, Quality, Duration, Modifying Factors, Severity, Associated Signs and Symptoms)  Note limiting factors. Chief Complaint: chest pain, SOB     Brooke Gillespie is a 44 y.o. female who presents to the emergency department today with complaints of chest pain, shortness of breath. Patient reports that she cannot really describe the chest pain, except that it is a tight sensation that is located all over her chest, \"like I can't expand my lungs fully\". Patient states that taking deep breaths does not necessarily make it better or worse. She describes the pain as an aching type pain with intermittent sharp stabs of pain that take her breath away. She states that while she has had some discomfort in the past, it has not been like this before. She states that she has tried her home inhalers, but they are not helping to alleviate her symptoms. She does report a history of a PE in 2016, that it was provoked by a , and is no longer taking any anticoagulants. States that this really does not feel like her previous PEs, that at that time she was experiencing leg swelling, and significant shortness of breath.     Upon further questioning, the patient does state that last week she had an episode of sharp left-sided chest pain that radiated down her left arm. She then further endorses that over the last month or more she has noticed some worsening shortness of breath with exertion, with associated chest pain that radiates down her left arm. When she rests, it typically gets better. She states that she does not have a history of hypertension that she is aware of, states that she knows she needs to lose weight, but denies any hyperlipidemia, diabetes. She denies any cocaine use or history of HIV. She states she really does not know what her family history is related to cardiac disease. She has no further complaints. Nursing Notes were all reviewed and agreed with or any disagreements were addressed in the HPI. REVIEW OF SYSTEMS    (2-9 systems for level 4, 10 or more for level 5)     Review of Systems   Constitutional: Negative for chills and fever. Eyes: Negative for photophobia and visual disturbance. Respiratory: Positive for chest tightness and shortness of breath. Negative for cough. Cardiovascular: Positive for chest pain. Negative for palpitations and leg swelling. Gastrointestinal: Negative for abdominal pain, diarrhea, nausea and vomiting. Neurological: Positive for headaches. Negative for syncope, facial asymmetry, weakness and numbness. Positives and Pertinent negatives as per HPI. Except as noted above in the ROS, all other systems were reviewed and negative.        PAST MEDICAL HISTORY     Past Medical History:   Diagnosis Date    Asthma     dx'd at 21 yo, not well-controlled    Hypertension     with this pregnancy    Obesity     Pre-eclampsia     Pulmonary emboli ([address]          SURGICAL HISTORY     Past Surgical History:   Procedure Laterality Date     SECTION      x 3    CHOLECYSTECTOMY, LAPAROSCOPIC  2018     Laparoscopic cholecystectomy with cholangiogram    UPPER GASTROINTESTINAL ENDOSCOPY N/A 2019    EGD DIAGNOSTIC ONLY performed by Damon Allen MD at [facility] EXTRACTION           CURRENTMEDICATIONS       Previous Medications    ALBUTEROL SULFATE HFA (PROVENTIL HFA) 108 (90 BASE) MCG/ACT INHALER    Inhale 2 puffs into the lungs every 4 hours as needed for Wheezing or Shortness of Breath (Space out to every 6 hours as symptoms improve) Space out to every 6 hours as symptoms improve. PANTOPRAZOLE (PROTONIX) 40 MG TABLET    Take 40 mg by mouth daily         ALLERGIES     Shellfish-derived products    FAMILYHISTORY       Family History   Problem Relation Age of Onset    High Blood Pressure Father     Diabetes Paternal Grandmother     High Blood Pressure Paternal Grandmother     Diabetes Paternal Grandfather     High Blood Pressure Paternal Grandfather     Asthma Mother     Cancer Maternal Grandmother           SOCIAL HISTORY       Social History     Tobacco Use    Smoking status: Never Smoker    Smokeless tobacco: Never Used   Vaping Use    Vaping Use: Never used   Substance Use Topics    Alcohol use: No     Comment: rarely    Drug use: No       SCREENINGS    [place] Coma Scale  Eye Opening: Spontaneous  Best Verbal Response: Oriented  Best Motor Response: Obeys commands  [place] Coma Scale Score: 15 Heart Score for chest pain patients  History: Moderately Suspicious  ECG: Normal  Patient Age: < 45 years  *Risk factors for Atherosclerotic disease: Obesity  Risk Factors: 1 or 2 risk factors  Troponin: < 1X normal limit  Heart Score Total: 2      PHYSICAL EXAM    (up to 7 for level 4, 8 or more for level 5)     ED Triage Vitals [05/27/22 0704]   BP Temp Temp Source Heart Rate Resp SpO2 Height Weight   (!) 186/107 97.9 °F (36.6 °C) Oral 82 18 99 % -- (!) 343 lb 12.8 oz (155.9 kg)       Physical Exam  Vitals and nursing note reviewed. Constitutional:       General: She is not in acute distress. Appearance: She is well-developed. She is not ill-appearing, toxic-appearing or diaphoretic. HENT:      Head: Normocephalic and atraumatic.    Eyes: Conjunctiva/sclera: Conjunctivae normal.      Pupils: Pupils are equal, round, and reactive to light. Cardiovascular:      Rate and Rhythm: Normal rate and regular rhythm. Pulmonary:      Effort: Pulmonary effort is normal. No respiratory distress. Breath sounds: No wheezing. Chest:      Chest wall: No tenderness. Abdominal:      General: Bowel sounds are normal. There is no distension. Palpations: Abdomen is soft. Tenderness: There is no abdominal tenderness. There is no guarding or rebound. Musculoskeletal:      Cervical back: Normal range of motion and neck supple. Skin:     General: Skin is warm and dry. Neurological:      Mental Status: She is alert and oriented to person, place, and time. Psychiatric:         Behavior: Behavior normal. Behavior is cooperative. Thought Content: Thought content normal.         DIAGNOSTIC RESULTS   LABS:    Labs Reviewed   BASIC METABOLIC PANEL W/ REFLEX TO MG FOR LOW K - Abnormal; Notable for the following components:       Result Value    Glucose 103 (*)     BUN 6 (*)     All other components within normal limits   URINALYSIS WITH REFLEX TO CULTURE - Abnormal; Notable for the following components:    Clarity, UA TURBID (*)     Protein, UA TRACE (*)     All other components within normal limits   D-DIMER, QUANTITATIVE - Abnormal; Notable for the following components:    D-Dimer, Quant 0.79 (*)     All other components within normal limits   MICROSCOPIC URINALYSIS - Abnormal; Notable for the following components:    Bacteria, UA 4+ (*)     Epithelial Cells, UA 18 (*)     All other components within normal limits   CBC WITH AUTO DIFFERENTIAL   HEPATIC FUNCTION PANEL   TROPONIN   HCG, SERUM, QUALITATIVE   TROPONIN       When ordered only abnormal lab results are displayed. All other labs were within normal range or not returned as of this dictation. EKG:  When ordered, EKG's are interpreted by the Emergency Department Physician in the absence of a cardiologist.  Please see their note for interpretation of EKG. RADIOLOGY:   Non-plain film images such as CT, Ultrasound and MRI are read by the radiologist. Plain radiographic images are visualized and preliminarily interpreted by the ED Provider with the below findings:        Interpretation per the Radiologist below, if available at the time of this note:    CT CHEST PULMONARY EMBOLISM W CONTRAST   Final Result   No findings to suggest large central pulmonary embolism. Evaluation beyond   the level of the liat is nondiagnostic due to marked artifact. Scattered bilateral atelectasis. No substantial interval change in small pulmonary nodules measuring up to   approximately 4 mm. RECOMMENDATIONS:   Unavailable         XR CHEST (2 VW)   Final Result   No acute process. No results found. PROCEDURES   Unless otherwise noted below, none     Procedures      CONSULTS:  IP CONSULT TO CARDIOLOGY  IP CONSULT TO HOSPITALIST      EMERGENCY DEPARTMENT COURSE and DIFFERENTIAL DIAGNOSIS/MDM:   Vitals:    Vitals:    05/27/22 1249 05/27/22 1300 05/27/22 1315 05/27/22 1325   BP: (!) 169/120 (!) 146/97 (!) 146/91    Pulse: 91 85 75 74   Resp: 17 22 18 19   Temp:       TempSrc:       SpO2: 100% 99%  100%   Weight:           Patient was given the following medications:  Medications   acetaminophen (TYLENOL) tablet 650 mg (650 mg Oral Given 5/27/22 0747)   iopamidol (ISOVUE-370) 76 % injection 75 mL (75 mLs IntraVENous Given 5/27/22 0916)   aspirin tablet 325 mg (325 mg Oral Given 5/27/22 1100)   acetaminophen (TYLENOL) tablet 650 mg (650 mg Oral Given 5/27/22 1325)   ketorolac (TORADOL) injection 15 mg (15 mg IntraVENous Given 5/27/22 1325)         Is this patient to be included in the SEP-1 Core Measure due to severe sepsis or septic shock? No   Exclusion criteria - the patient is NOT to be included for SEP-1 Core Measure due to:   Infection is not suspected    ED COURSE & MEDICAL DECISION MAKING    - The patient presented to the ER with complaints of chest pain, SOB. Vital signs were reviewed. Exam as above. Peripheral IV placed. Labs, Imaging ordered. - Pertinent Labs & Imaging studies reviewed. (See chart for details)   -  Patient seen and evaluated in the emergency department. -  Triage and nursing notes reviewed and incorporated. -  Old chart records reviewed and incorporated. -   I have seen and evaluated this patient with my supervising physician Miguel A Lorenzo MD.  -  Differential diagnosis includes: acute coronary syndrome, pulmonary embolism, COPD/asthma, pneumonia, musculoskeletal, reflux/PUD/gastritis, pneumothorax, CHF, thoracic aortic dissection, anxiety  -  Work-up included:  See above  -  ED treatment included:  aspirin, tylenol, aspirin, toradol  - Consults: Cardiology - Dr Stacy Elias with cardiology came to evaluate the patient in the ED. He was able to arrange a same-day stress test and advised that we canceled the 3-hour troponin. However while the patient was in the stress lab, she had a syncopal episode and a CODE BLUE was called. Patient did not lose a pulse per ED staff that responded. She was able to tell me what happened leading up to the event, that she began to have some of the recurrent symptoms, and when she sat down she felt like her pulse was pounding in her ears, and then she woke up on the floor. She had no loss of bowel or bladder function, she denies biting her tongue. Patient states that she remembers everything leading up to it, immediately following the syncopal episode. She was then brought back to the emergency department, her blood pressure was found to be marginally elevated, and the decision was made to admit the patient to the hospital for further evaluation. Hospitalist consult was placed for admission orders.  -  Results discussed with patient and/or family.   Labs show no leukocytosis or concerning anemia, metabolic panel with no concerning abnormalities. Troponin is less than 0.01. She is not pregnant. Urine with no evidence of infection. D-dimer is mildly elevated at 0.79. Imaging studies show chest x-ray with no acute process, CT of the chest with no findings to suggest a large central pulmonary embolism, but evaluation beyond the level of the liat is nondiagnostic due to marked artifact, and no substantial change in the small pulmonary nodules. At this time, we recommend admission, as the patient has exertional chest pain and dyspnea, had a syncopal episode at the stress lab, and would benefit from admission for further evaluation and management. The patient and/or family is agreeable with plan of care and disposition.  -  Disposition:   Admission  - Critical Care: The total critical care time I independently spent while evaluating and treating this patient was 18 minutes. This excludes time spent doing separately billable procedures. This includes time at the bedside, data interpretation, medication management, obtaining critical history from collateral sources if the patient is unable to provide it directly, and physician consultation. Specifics of interventions taken and potentially life-threatening diagnostic considerations are listed above in the medical decision making. If this was a shared visit with an physician, the time in this attestation is non-concurrent critical care time out of the total shared critical care time provided by the physician and myself. FINAL IMPRESSION      1. Chest pain, unspecified type    2. PITTMAN (dyspnea on exertion)    3. Syncope and collapse          DISPOSITION/PLAN   DISPOSITION Decision To Admit 05/27/2022 01:12:39 PM      PATIENT REFERRED TO:  No follow-up provider specified.     DISCHARGE MEDICATIONS:  New Prescriptions    No medications on file       DISCONTINUED MEDICATIONS:  Discontinued Medications    No medications on file              (Please note that portions of this note were completed with a voice recognition program.  Efforts were made to edit the dictations but occasionally words are mis-transcribed.)    CANDELARIO Olivares (electronically signed)           Deirdre Olivares  05/27/22 4222

## 2022-05-27 NOTE — CODE DOCUMENTATION
Patient completed stress test. At 6 minutes 50 second recovery patient was talking and sitting in chair drinking water. Her head went back, water spilled and arms and legs became stiff. Patient fell backwards in chair. Writer tried to break fall. Was on ground rolling back and forth-unresponsive. Sinus Tach on monitor- rates 120-150 BPM. Code team responded and patient regained conciouseness. Unable to obtain BP initially. Obtained it on second try at 156/91. Patient assisted to wheelchair and returned to ER with ER staff at 15 minute recovery.

## 2022-05-27 NOTE — H&P
Hospitalist  History and Physical    Patient:  Antony Friend  MRN: [id number]  PCP: Celsa Gray MD    CHIEF COMPLAINT: Chest Pain      HISTORY OF PRESENT ILLNESS:   The patient Antony Friend is a 44 y. o.female with medical history significant for asthma. Patient presented to the emergency room with chest pain that is pressure-like in nature associated with shortness of breath. Patient was ruled out for acute coronary syndrome with EKG and enzyme criteria. Patient underwent cardiac stress testing. Patient completed 6 minutes and 50 seconds of stress test and then during the recovery phase patient was sitting in the chair while drinking water. Patient fell backwards in the chair. Patient fell to the ground and became unresponsive. Patient remained in sinus tachycardia on the monitor. Whole team responded and patient regained consciousness. At the time of examination patient denies any complaints other than some chest pressure. Past Medical History:        Diagnosis Date    Asthma     dx'd at 21 yo, not well-controlled    Hypertension     with this pregnancy    Obesity     Pre-eclampsia     Pulmonary emboli (HCC)        Past Surgical History:        Procedure Laterality Date     SECTION      x 3    CHOLECYSTECTOMY, LAPAROSCOPIC  2018     Laparoscopic cholecystectomy with cholangiogram    UPPER GASTROINTESTINAL ENDOSCOPY N/A 2019    EGD DIAGNOSTIC ONLY performed by To Goel MD at [facility]         Medications Prior to Admission:    Prior to Admission medications    Medication Sig Start Date End Date Taking?  Authorizing Provider   pantoprazole (PROTONIX) 40 MG tablet Take 40 mg by mouth daily    Historical Provider, MD   albuterol sulfate HFA (PROVENTIL HFA) 108 (90 Base) MCG/ACT inhaler Inhale 2 puffs into the lungs every 4 hours as needed for Wheezing or Shortness of Breath (Space out to every 6 hours as symptoms improve) Space out to every 6 hours as symptoms improve. 12/6/20   CANDELARIO Steinberg       Allergies:  Shellfish-derived products      Social History:   TOBACCO:   reports that she has never smoked. She has never used smokeless tobacco.  ETOH:   reports no history of alcohol use. Family History:      Problem Relation Age of Onset    High Blood Pressure Father     Diabetes Paternal Grandmother     High Blood Pressure Paternal Grandmother     Diabetes Paternal Grandfather     High Blood Pressure Paternal Grandfather     Asthma Mother     Cancer Maternal Grandmother            REVIEW OF SYSTEMS:     patients reported symptoms are in BOLD all other symptoms are negative. CONSTITUTIONAL:      fatigue, fever, chills or night sweats, recent weight gain, recent wt loss, insomnia,  General weakness, poor appetite, muscle aches and pains    HEAD: headache, dizziness    EYES:      blurriness,  double vision, dryness,  discharge, irritation,diplopia    EARS:      hearing loss, vertigo, ear discharge,  Earache. Ringing in the ears. NOSE:      Rhinorrhea, sneezing, epistaxis. Discharge, sinusitis,     MOUTH/THROAT:         sore throat, mouth ulcers, Hoarseness    RESPIRATORY:        Shortness of breath, wheezing,  cough, sputum, hemoptysis, obstructive sleep apnea,    CARDIOVASCULAR :      chest pain, palpitations, dyspnea on exercise, Lower extrimity edema (swelling), chest tightness    GASTROINTESTINAL:       Dysphagia, Poor appetite,  Nausea, Vomiting, diarrhea, heartburn, abdominal pain. Blood in the stools, hematemesis. Pain with swallowing, constipation    GENITOURINARY:       Urinary frequency, hesitancy,  urgency, Dysuria, hematuria,  Urinary Incontinence. Urinary Retention. GYNECOLOGICAL: vaginal bleeding , vaginal discharge, menopause    MUSCULOSKELETAL:       joint swelling or stiffness, joint pain, muscle pain, balance problems, low back pain. NEUROLOGICAL:      Gait problems. Tremor. Dizziness.  Pain and paresthesias, weakness in extremities. Seizures, memory loss    PSYCHLOGICAL:        Anxiety, depression    SKIN :      Rashes ulcers, skin color changes, easy bruisability, lymphadenopathy      Physical Exam:      Vitals: /88   Pulse 76   Temp 97.9 °F (36.6 °C) (Oral)   Resp 30   Wt (!) 343 lb 12.8 oz (155.9 kg)   LMP  (LMP Unknown)   SpO2 100%   BMI 49.33 kg/m²     Gen:          Alert and oriented x3  Eyes: PERRL. No sclera icterus. No conjunctival injection. ENT: No discharge. Pharynx clear. External appearance of ears and nose normal.  Neck: Trachea midline. No obvious mass. Resp: No accessory muscle use. No crackles. No wheezes. No rhonchi. CV: Regular rate. Regular rhythm. No murmur or rub. No edema. GI: Non-tender. Non-distended. No hernia. Skin: Warm, dry, normal texture and turgor. Lymph: No cervical LAD. No supraclavicular LAD. M/S: / Ext. No cyanosis. No clubbing. No joint deformity. Neuro: Moves all four extremities. CN 2-12 tested, no deficits noted. Peripheral pulses and capillary refill is intact. CBC:   Recent Labs     05/27/22  0746   WBC 4.4   HGB 13.7        BMP:    Recent Labs     05/27/22  0746      K 3.9      CO2 22   BUN 6*   CREATININE 0.7   GLUCOSE 103*     Hepatic:   Recent Labs     05/27/22  0746   AST 19   ALT 17   BILITOT <0.2   ALKPHOS 103     Troponin:   Recent Labs     05/27/22  0746 05/27/22  1322   TROPONINI <0.01 <0.01     BNP: No results for input(s): BNP in the last 72 hours. INR: No results for input(s): INR in the last 72 hours. Lab Results   Component Value Date    LABA1C 5.8 12/16/2020           No results for input(s): CKTOTAL in the last 72 hours. -----------------------------------------------------------------    XR CHEST  No acute process.     CT CHEST PULMONARY EMBOLISM W CONTRAST  No findings to suggest large central pulmonary embolism.  Evaluation beyond  the level of the liat is nondiagnostic due to marked artifact. Scattered bilateral atelectasis. No substantial interval change in small pulmonary nodules measuring up to  approximately 4 mm. Ekg  Normal sinus rhythmPoor R wave progressionAbnormal    Echo  Normal left ventricle size, wall thickness, and systolic function with an   estimated ejection fraction of 60-65%(3/17/2016)    Assessment / Plan     Chest pain, unspecified R07.9  Atypical chest pain  Admit patient to telemetry  Cardiac enzymes are negative  Stress test does not show reversible ischemia  Continue patient on Protonix  Continue patient on aspirin, and statin    Syncope  Etiology is not clear  Monitor patient on telemetry  Get echocardiogram in a.m. DVT and GI prophylaxis      Prior      Vilma Roach MD M.D    This note was transcribed using [id number] [employer]. Please disregard any translational errors.

## 2022-05-27 NOTE — ED NOTES
Alessandra bansal called in stress lab. Per EKG RN's, patient was in recovery and lost consciousness and fell backward in her chair. They state that she was \"out for a little bit. \" Unsure if patient hit head. Patient endorses increasing dizziness prior to LOC.      Ana Luna RN  05/27/22 7468

## 2022-05-28 ENCOUNTER — APPOINTMENT (OUTPATIENT)
Dept: CT IMAGING | Age: 37
End: 2022-05-28
Payer: COMMERCIAL

## 2022-05-28 VITALS
TEMPERATURE: 98.2 F | DIASTOLIC BLOOD PRESSURE: 82 MMHG | HEART RATE: 71 BPM | OXYGEN SATURATION: 97 % | SYSTOLIC BLOOD PRESSURE: 144 MMHG | BODY MASS INDEX: 49.82 KG/M2 | RESPIRATION RATE: 16 BRPM | WEIGHT: 293 LBS

## 2022-05-28 LAB — TROPONIN: <0.01 NG/ML

## 2022-05-28 PROCEDURE — 94760 N-INVAS EAR/PLS OXIMETRY 1: CPT

## 2022-05-28 PROCEDURE — 6370000000 HC RX 637 (ALT 250 FOR IP): Performed by: HOSPITALIST

## 2022-05-28 PROCEDURE — G0378 HOSPITAL OBSERVATION PER HR: HCPCS

## 2022-05-28 PROCEDURE — 6360000002 HC RX W HCPCS: Performed by: HOSPITALIST

## 2022-05-28 PROCEDURE — 70450 CT HEAD/BRAIN W/O DYE: CPT

## 2022-05-28 PROCEDURE — 96372 THER/PROPH/DIAG INJ SC/IM: CPT

## 2022-05-28 PROCEDURE — 36415 COLL VENOUS BLD VENIPUNCTURE: CPT

## 2022-05-28 PROCEDURE — 84484 ASSAY OF TROPONIN QUANT: CPT

## 2022-05-28 RX ORDER — BUTALBITAL, ACETAMINOPHEN AND CAFFEINE 50; 325; 40 MG/1; MG/1; MG/1
1 TABLET ORAL EVERY 4 HOURS PRN
Qty: 30 TABLET | Refills: 1 | Status: SHIPPED | OUTPATIENT
Start: 2022-05-28

## 2022-05-28 RX ORDER — METOPROLOL SUCCINATE 50 MG/1
50 TABLET, EXTENDED RELEASE ORAL DAILY
Qty: 30 TABLET | Refills: 0 | Status: SHIPPED | OUTPATIENT
Start: 2022-05-28

## 2022-05-28 RX ADMIN — PANTOPRAZOLE SODIUM 40 MG: 40 TABLET, DELAYED RELEASE ORAL at 09:01

## 2022-05-28 RX ADMIN — OXYCODONE AND ACETAMINOPHEN 1 TABLET: 5; 325 TABLET ORAL at 09:01

## 2022-05-28 RX ADMIN — ENOXAPARIN SODIUM 40 MG: 100 INJECTION SUBCUTANEOUS at 09:01

## 2022-05-28 ASSESSMENT — PAIN DESCRIPTION - ORIENTATION: ORIENTATION: MID

## 2022-05-28 ASSESSMENT — PAIN - FUNCTIONAL ASSESSMENT: PAIN_FUNCTIONAL_ASSESSMENT: PREVENTS OR INTERFERES SOME ACTIVE ACTIVITIES AND ADLS

## 2022-05-28 ASSESSMENT — PAIN DESCRIPTION - FREQUENCY: FREQUENCY: CONTINUOUS

## 2022-05-28 ASSESSMENT — PAIN DESCRIPTION - ONSET: ONSET: ON-GOING

## 2022-05-28 ASSESSMENT — PAIN DESCRIPTION - PAIN TYPE: TYPE: ACUTE PAIN

## 2022-05-28 ASSESSMENT — PAIN DESCRIPTION - DESCRIPTORS: DESCRIPTORS: POUNDING

## 2022-05-28 ASSESSMENT — PAIN DESCRIPTION - LOCATION: LOCATION: HEAD

## 2022-05-28 ASSESSMENT — PAIN SCALES - GENERAL
PAINLEVEL_OUTOF10: 0
PAINLEVEL_OUTOF10: 7

## 2022-05-28 NOTE — PROGRESS NOTES
Written and verbal DC instructions reviewed with pt. Pt states understanding. All questions answered. Pt transported via wheelchair with all personal belongings.      Electronically signed by Raffaele Reich RN on 5/28/2022 at 3:41 PM

## 2022-05-28 NOTE — CARE COORDINATION
CASE MANAGEMENT DISCHARGE SUMMARY:    DISCHARGE DATE: 05/28/20222    DISCHARGED TO: home     PREFERRED PHARMACY: [email or web address] in [address], RN, BSN, Case Management  [phone]  Electronically signed by Frankey Flies, RN on 5/28/2022 at 12:43 PM

## 2022-05-28 NOTE — PROGRESS NOTES
IV Fluids stopped and IV removed per patient request, fluids starting to infiltrate, minor swelling to site. Patient does not want to be restuck again for IV insertion. Refusing IV fluids at this time.

## 2022-05-28 NOTE — CARE COORDINATION
INITIAL CASE MANAGEMENT ASSESSMENT    Spoke with patient to assess possible discharge needs. Explained Case Management role/services. Living Situation: lives with her children    ADLs: independent, works as a surgical technician     DME: none    PT/OT Recs: N/A     Active Services: None     Transportation: has a ride home, active      Medications: confirmed UMR, uses [facility] in [place] on [address]    PCP: Mabel Sweet MD      HD/PD: N/A    PLAN/COMMENTS: Plan is to return home, denies needs. Provided contact information for patient or family to call with any questions. Will follow and assist as needed.     Noreen Cantu RN, BSN, Case Management  [phone]  Electronically signed by Noreen Cantu RN on 5/28/2022 at 12:42 PM

## 2022-05-28 NOTE — PROGRESS NOTES
Dr Queenie Marsh made aware that pt has no IV access due to patient refusal.    Electronically signed by Cordie Apley, RN on 5/28/2022 at 10:38 AM

## 2022-05-28 NOTE — DISCHARGE INSTR - DIET

## 2022-05-28 NOTE — PLAN OF CARE
Problem: Discharge Planning  Goal: Discharge to home or other facility with appropriate resources  5/28/2022 0439 by Stevie Gilmore RN  Outcome: Progressing  5/27/2022 1654 by Arturo Jay RN  Outcome: Progressing     Problem: Pain  Goal: Verbalizes/displays adequate comfort level or baseline comfort level  5/28/2022 0439 by Stevie Gilmore RN  Outcome: Progressing  5/27/2022 1654 by Arturo Jay RN  Outcome: Progressing     Problem: Safety - Adult  Goal: Free from fall injury  Outcome: Progressing

## 2022-05-28 NOTE — PLAN OF CARE
Problem: Discharge Planning  Goal: Discharge to home or other facility with appropriate resources  5/28/2022 1024 by Belia Serra RN  Outcome: Progressing  5/28/2022 0439 by Tonja Baez RN  Outcome: Progressing     Problem: Pain  Goal: Verbalizes/displays adequate comfort level or baseline comfort level  5/28/2022 1024 by Belia Serra RN  Outcome: Progressing  5/28/2022 0439 by Tonja Baez RN  Outcome: Progressing     Problem: Safety - Adult  Goal: Free from fall injury  5/28/2022 1024 by Belia Serra RN  Outcome: Progressing  5/28/2022 0439 by Tonja Baez RN  Outcome: Progressing     Problem: ABCDS Injury Assessment  Goal: Absence of physical injury  Outcome: Progressing

## 2022-05-30 NOTE — DISCHARGE SUMMARY
Hospital Medicine Discharge Summary      Patient ID: Brooke Gillespie , [id number]     Patient's PCP: Swapna Cabral MD    Admit Date: 2022     Discharge Date: 2022      Admitting Physician: Briana Anaya MD    Discharge Physician: Bud Smart MD     Discharge Diagnoses: Active Hospital Problems    Diagnosis Date Noted    Syncope and collapse [R55] 2022     Priority: Medium         The patient was seen and examined on the day of discharge and this discharge summary is in conjunction with any daily progress note from day of discharge. HOSPITAL COURSE    Patient demographics:  The patient  Brooke Gillespie is a 44 y.o. female      Significant past medical history:       Patient Active Problem List   Diagnosis    Obesity    Asthma, moderate persistent, poorly-controlled    Decreased fetal movement    Preeclampsia    History of     Asthma affecting pregnancy, antepartum    Acute pulmonary embolism ([address]    S/P  section    Hypertension in pregnancy, preeclampsia, delivered    Fever    Allergic sinusitis    Laryngopharyngeal reflux disease    Acute calculous cholecystitis    Acute epigastric pain    Liver hematoma    S/P laparoscopic cholecystectomy    Liver hematoma and contusion, initial encounter    GI bleed    Numbness and tingling in left hand    Syncope and collapse            Presenting symptoms:  Chest pain     Diagnostic workup:   CT CHEST PULMONARY EMBOLISM W CONTRAST   CT HEAD WO CONTRAST           CONSULTS DURING ADMISSION :   IP CONSULT TO CARDIOLOGY  IP CONSULT TO HOSPITALIST        Patient was diagnosed with:  Chest pain, unspecified  Uncontrolled hypertension     Treatment while inpatient:  Patient presented to the emergency room with chest pain that is pressure-like in nature associated with shortness of breath. Patient was ruled out for acute coronary syndrome.   Patient underwent cardiac stress testing that was low probability study. After cardiac stress testing patient passed out. Patient was further monitored on the telemetry overnight. No further arrhythmias are episodes of syncope. Likely patient's episode was vasovagal.   While in the hospital patient's blood pressure was on the high side. patient's echocardiogram was ordered but because of the long weekend echocardiogram was not done. Patient is advised to get the echocardiogram as an outpatient. Patient also reported severe headache since in the hospital for which CT scan of the head was done which did not show any acute abnormality. Patient will be discharged home on Fioricet as needed              Patient will be started on 50 mg toprol XL 50 mg daily.           Discharge Condition:  stable      Discharged to:  Home      Activity:   as tolerated:     Follow Up: Follow-up with PCP in 1-2 weeks                     Labs: For convenience and continuity at follow-up the following most recent labs are provided:      CBC:   Lab Results   Component Value Date    WBC 4.4 05/27/2022    HGB 13.7 05/27/2022    HCT 41.0 05/27/2022     05/27/2022       RENAL:   Lab Results   Component Value Date     05/27/2022    K 3.9 05/27/2022     05/27/2022    CO2 22 05/27/2022    BUN 6 05/27/2022    CREATININE 0.7 05/27/2022           Discharge Medications:      Medication List      START taking these medications    butalbital-acetaminophen-caffeine -40 MG per tablet  Commonly known as: FIORICET, ESGIC  Take 1 tablet by mouth every 4 hours as needed for Headaches     metoprolol succinate 50 MG extended release tablet  Commonly known as:  Toprol XL  Take 1 tablet by mouth daily        CONTINUE taking these medications    albuterol sulfate  (90 Base) MCG/ACT inhaler  Commonly known as: Proventil HFA  Inhale 2 puffs into the lungs every 4 hours as needed for Wheezing or Shortness of Breath (Space out to every 6 hours as symptoms improve) Space out to every 6 hours as symptoms improve. pantoprazole 40 MG tablet  Commonly known as: PROTONIX           Where to Get Your Medications      These medications were sent to [facility] [id number] [facility], [address] [place] [address] [place]. Holli Sawyer [phone] Joseph Rodarte [phone]  [address] [place] [address]    Phone: [phone]   · butalbital-acetaminophen-caffeine -40 MG per tablet  · metoprolol succinate 50 MG extended release tablet            Time Spent on discharge is more than 30 min in the examination, evaluation, counseling and review of medications and discharge plan. Signed:  Bhakti Rollins MD   5/29/2022      Thank you Quin Phillips MD for the opportunity to be involved in this patient's care. If you have any questions or concerns please feel free to contact me at [phone]. This note was transcribed using [id number] [employer]. Please disregard any translational errors.

## 2022-07-18 ENCOUNTER — HOSPITAL ENCOUNTER (EMERGENCY)
Age: 37
Discharge: HOME | End: 2022-07-18
Attending: EMERGENCY MEDICINE
Payer: COMMERCIAL

## 2022-07-18 ENCOUNTER — APPOINTMENT (OUTPATIENT)
Dept: CT IMAGING | Age: 37
End: 2022-07-18
Payer: COMMERCIAL

## 2022-07-18 VITALS
RESPIRATION RATE: 20 BRPM | HEIGHT: 69 IN | WEIGHT: 293 LBS | SYSTOLIC BLOOD PRESSURE: 145 MMHG | OXYGEN SATURATION: 100 % | DIASTOLIC BLOOD PRESSURE: 81 MMHG | HEART RATE: 75 BPM | BODY MASS INDEX: 43.4 KG/M2 | TEMPERATURE: 98 F

## 2022-07-18 DIAGNOSIS — R55 VASOVAGAL SYNCOPE: ICD-10-CM

## 2022-07-18 DIAGNOSIS — R07.9 CHEST PAIN, UNSPECIFIED TYPE: Primary | ICD-10-CM

## 2022-07-18 LAB
A/G RATIO: 1.3 (ref 1.1–2.2)
ALBUMIN SERPL-MCNC: 3.9 G/DL (ref 3.4–5)
ALP BLD-CCNC: 114 U/L (ref 40–129)
ALT SERPL-CCNC: 12 U/L (ref 10–40)
ANION GAP SERPL CALCULATED.3IONS-SCNC: 15 MMOL/L (ref 3–16)
AST SERPL-CCNC: 16 U/L (ref 15–37)
BASOPHILS ABSOLUTE: 0.1 K/UL (ref 0–0.2)
BASOPHILS RELATIVE PERCENT: 1.3 %
BILIRUB SERPL-MCNC: <0.2 MG/DL (ref 0–1)
BUN BLDV-MCNC: 8 MG/DL (ref 7–20)
CALCIUM SERPL-MCNC: 9.3 MG/DL (ref 8.3–10.6)
CHLORIDE BLD-SCNC: 103 MMOL/L (ref 99–110)
CO2: 17 MMOL/L (ref 21–32)
CREAT SERPL-MCNC: 0.7 MG/DL (ref 0.6–1.1)
EOSINOPHILS ABSOLUTE: 0.2 K/UL (ref 0–0.6)
EOSINOPHILS RELATIVE PERCENT: 3.7 %
GFR AFRICAN AMERICAN: >60
GFR NON-AFRICAN AMERICAN: >60
GLUCOSE BLD-MCNC: 101 MG/DL (ref 70–99)
GLUCOSE BLD-MCNC: 96 MG/DL (ref 70–99)
HCT VFR BLD CALC: 38.7 % (ref 36–48)
HEMOGLOBIN: 13.1 G/DL (ref 12–16)
LYMPHOCYTES ABSOLUTE: 1.9 K/UL (ref 1–5.1)
LYMPHOCYTES RELATIVE PERCENT: 40.8 %
MCH RBC QN AUTO: 30.1 PG (ref 26–34)
MCHC RBC AUTO-ENTMCNC: 33.9 G/DL (ref 31–36)
MCV RBC AUTO: 88.8 FL (ref 80–100)
MONOCYTES ABSOLUTE: 0.4 K/UL (ref 0–1.3)
MONOCYTES RELATIVE PERCENT: 9.2 %
NEUTROPHILS ABSOLUTE: 2.1 K/UL (ref 1.7–7.7)
NEUTROPHILS RELATIVE PERCENT: 45 %
PDW BLD-RTO: 13 % (ref 12.4–15.4)
PERFORMED ON: NORMAL
PLATELET # BLD: 331 K/UL (ref 135–450)
PMV BLD AUTO: 8.4 FL (ref 5–10.5)
POTASSIUM REFLEX MAGNESIUM: 4 MMOL/L (ref 3.5–5.1)
RBC # BLD: 4.36 M/UL (ref 4–5.2)
SODIUM BLD-SCNC: 135 MMOL/L (ref 136–145)
TOTAL PROTEIN: 6.9 G/DL (ref 6.4–8.2)
TROPONIN: <0.01 NG/ML
WBC # BLD: 4.6 K/UL (ref 4–11)

## 2022-07-18 PROCEDURE — 80053 COMPREHEN METABOLIC PANEL: CPT

## 2022-07-18 PROCEDURE — 99285 EMERGENCY DEPT VISIT HI MDM: CPT

## 2022-07-18 PROCEDURE — 6360000004 HC RX CONTRAST MEDICATION: Performed by: EMERGENCY MEDICINE

## 2022-07-18 PROCEDURE — 70450 CT HEAD/BRAIN W/O DYE: CPT

## 2022-07-18 PROCEDURE — 84484 ASSAY OF TROPONIN QUANT: CPT

## 2022-07-18 PROCEDURE — 36415 COLL VENOUS BLD VENIPUNCTURE: CPT

## 2022-07-18 PROCEDURE — 93005 ELECTROCARDIOGRAM TRACING: CPT | Performed by: EMERGENCY MEDICINE

## 2022-07-18 PROCEDURE — 85025 COMPLETE CBC W/AUTO DIFF WBC: CPT

## 2022-07-18 PROCEDURE — 71260 CT THORAX DX C+: CPT | Performed by: PHYSICIAN ASSISTANT

## 2022-07-18 RX ORDER — KETOROLAC TROMETHAMINE 10 MG/1
10 TABLET, FILM COATED ORAL EVERY 6 HOURS PRN
Qty: 20 TABLET | Refills: 0 | Status: SHIPPED | OUTPATIENT
Start: 2022-07-18

## 2022-07-18 RX ADMIN — IOPAMIDOL 75 ML: 755 INJECTION, SOLUTION INTRAVENOUS at 19:55

## 2022-07-18 ASSESSMENT — ENCOUNTER SYMPTOMS
ABDOMINAL PAIN: 0
DIARRHEA: 0
CONSTIPATION: 0
NAUSEA: 0
BACK PAIN: 0
EYE PAIN: 0
VOMITING: 0
SHORTNESS OF BREATH: 1
SORE THROAT: 0
COUGH: 0

## 2022-07-18 NOTE — ED NOTES
Pt resting in bed at this time, laying in a supine position with head of bed elevated . Call light remains in reach instructed pt how to use, and encouraged pt to call if needed assistance, no distress noted. RR even and unlabored, skin warm and dry. No needs at this time. Will continue to monitor closely.        Markel Holm, CHRISS  07/18/22 6612

## 2022-07-18 NOTE — ED PROVIDER NOTES
[address] [facility]        Pt Name: Magdalene Oropeza  MRN: [id number]  [place] 1985  Date of evaluation: 7/18/2022  Provider: Gabe Kwan PA-C  PCP: Susy Watts MD  Note Started: 7:00 PM EDT       I have seen and evaluated this patient with my supervising physician Lisa Coreas MD.       Triage CHIEF COMPLAINT       Chief Complaint   Patient presents with    Other     Pt was in the lobby, stated she could not hear but she could see people's lips moving started to run tripped and fell in the lobby, pt states that she doesn't know why she was running          HISTORY OF PRESENT ILLNESS   (Location/Symptom, Timing/Onset, Context/Setting, Quality, Duration, Modifying Factors, Severity)  Note limiting factors. Chief Complaint: Chest pain, syncopal episode    Mgadalene Oropeza is a 44 y.o. female who presents to the emergency department with complaint of chest pain that has been intermittent for the past couple of days. She describes the pain as retrosternal and sharp that is not worsened by movement, exercise or deep breaths. She states that ibuprofen does not help the chest pain. She states that it stays behind her sternum, does not radiate anywhere else. This brought her into the emergency department. Patient states that when she got here she heard humming in her ears and went to sit down. She states that then she was unable to hear the meme checking in at the , however his lips were moving. Because of this she decided to stand up very quickly as she was a little freaked out. When she stood up she felt lightheaded and was told that she had an episode of syncope. Staff states that patient was out of it for about 10 seconds, and then she slowly came back to herself.   Patient states that this is happened in the past when she was getting a stress test.  She was hospitalized for this and they determined that this was related to a vasovagal episode. Patient stress test at that time was normal.  However she was supposed to get an echocardiogram which she did not do. She has not followed up with a cardiologist.  She was put on high blood pressure medication at that time, however she states that she has not been taking it. Patient states that she does have a history of pulmonary embolism, however is not currently on any blood thinners at this time. She states that over the weekend on Saturday she did do a \"[employer]andel[employer] Mudder\" which is more active than she normally is. Patient states that she does have some associated shortness of breath, however is unsure if this is related to her asthma. She has no other complaints at this time. She states that she has a lot of anxiety surrounding her exams that she has been studying for. Nursing Notes were all reviewed and agreed with or any disagreements were addressed in the HPI. REVIEW OF SYSTEMS    (2-9 systems for level 4, 10 or more for level 5)     Review of Systems   Constitutional:  Negative for chills and fever. HENT:  Negative for ear pain and sore throat. Eyes:  Negative for pain and visual disturbance. Respiratory:  Positive for shortness of breath. Negative for cough. Cardiovascular:  Positive for chest pain. Negative for leg swelling. Gastrointestinal:  Negative for abdominal pain, constipation, diarrhea, nausea and vomiting. Genitourinary:  Negative for dysuria and hematuria. Musculoskeletal:  Negative for back pain and neck pain. Skin:  Negative for rash and wound. Neurological:  Positive for syncope. Negative for light-headedness and headaches.      PAST MEDICAL HISTORY     Past Medical History:   Diagnosis Date    Asthma     dx'd at 21 yo, not well-controlled    Hypertension     with this pregnancy    Obesity     Pre-eclampsia     Pulmonary emboli ([address]        SURGICAL HISTORY     Past Surgical History:   Procedure Laterality Date  SECTION      x 3    CHOLECYSTECTOMY, LAPAROSCOPIC  2018     Laparoscopic cholecystectomy with cholangiogram    UPPER GASTROINTESTINAL ENDOSCOPY N/A 2019    EGD DIAGNOSTIC ONLY performed by Willa Haro MD at [address] [place] [address]       Discharge Medication List as of 2022  9:40 PM        CONTINUE these medications which have NOT CHANGED    Details   metoprolol succinate (TOPROL XL) 50 MG extended release tablet Take 1 tablet by mouth daily, Disp-30 tablet, R-0Normal      butalbital-acetaminophen-caffeine (FIORICET, ESGIC) -40 MG per tablet Take 1 tablet by mouth every 4 hours as needed for Headaches, Disp-30 tablet, R-1Normal      pantoprazole (PROTONIX) 40 MG tablet Take 40 mg by mouth dailyHistorical Med      albuterol sulfate HFA (PROVENTIL HFA) 108 (90 Base) MCG/ACT inhaler Inhale 2 puffs into the lungs every 4 hours as needed for Wheezing or Shortness of Breath (Space out to every 6 hours as symptoms improve) Space out to every 6 hours as symptoms improve., Disp-1 Inhaler,R-0Print             ALLERGIES     Shellfish-derived products    FAMILYHISTORY       Family History   Problem Relation Age of Onset    High Blood Pressure Father     Diabetes Paternal Grandmother     High Blood Pressure Paternal Grandmother     Diabetes Paternal Grandfather     High Blood Pressure Paternal Grandfather     Asthma Mother     Cancer Maternal Grandmother         SOCIAL HISTORY       Social History     Socioeconomic History    Marital status:      Spouse name: None    Number of children: None    Years of education: None    Highest education level: None   Tobacco Use    Smoking status: Never    Smokeless tobacco: Never   Vaping Use    Vaping Use: Never used   Substance and Sexual Activity    Alcohol use: No     Comment: rarely    Drug use: No    Sexual activity: Yes     Partners: Male       SCREENINGS    [place] Coma Scale  Eye Opening: Spontaneous  Best Verbal Response: Oriented  Best Motor Response: Obeys commands  [place] Coma Scale Score: 15        PHYSICAL EXAM    (up to 7 for level 4, 8 or more for level 5)     ED Triage Vitals [07/18/22 1831]   BP Temp Temp src Heart Rate Resp SpO2 Height Weight   -- 98 °F (36.7 °C) -- 86 20 100 % 5' 9\" (1.753 m) (!) 351 lb 13.7 oz (159.6 kg)       Physical Exam  Constitutional:       General: She is not in acute distress. Appearance: Normal appearance. She is not ill-appearing, toxic-appearing or diaphoretic. HENT:      Head: Normocephalic and atraumatic. Right Ear: External ear normal.      Left Ear: External ear normal.      Nose: Nose normal.      Mouth/Throat:      Mouth: Mucous membranes are moist.      Pharynx: Oropharynx is clear. No oropharyngeal exudate. Eyes:      General: No visual field deficit. Right eye: No discharge. Left eye: No discharge. Cardiovascular:      Rate and Rhythm: Normal rate and regular rhythm. Pulses: Normal pulses. Heart sounds: Normal heart sounds. No murmur heard. No gallop. Pulmonary:      Effort: Pulmonary effort is normal. No respiratory distress. Breath sounds: Normal breath sounds. No stridor. No wheezing, rhonchi or rales. Musculoskeletal:         General: Normal range of motion. Cervical back: Normal range of motion. Right lower leg: No edema. Left lower leg: No edema. Skin:     General: Skin is warm and dry. Neurological:      General: No focal deficit present. Mental Status: She is alert and oriented to person, place, and time. Cranial Nerves: No cranial nerve deficit. Sensory: Sensation is intact. Motor: No pronator drift.       Coordination: Finger-Nose-Finger Test and Heel to [employer] normal.      Comments: I did not have patient walk  Normal truncal stability  No horizontal or vertical nystagmus  Negative test of skew   Psychiatric:         Mood and Affect: Mood normal. Behavior: Behavior normal.       DIAGNOSTIC RESULTS   LABS:    Labs Reviewed   COMPREHENSIVE METABOLIC PANEL W/ REFLEX TO MG FOR LOW K - Abnormal; Notable for the following components:       Result Value    Sodium 135 (*)     CO2 17 (*)     Glucose 101 (*)     All other components within normal limits   CBC WITH AUTO DIFFERENTIAL   TROPONIN   POCT GLUCOSE   POCT GLUCOSE       When ordered, only abnormal lab results are displayed. All other labs were within normal range or not returned as of this dictation. EKG: When ordered, EKG's are interpreted by the Emergency Department Physician in the absence of a cardiologist.  Please see their note for interpretation of EKG. RADIOLOGY:   Non-plain film images such as CT, Ultrasound and MRI are read by the radiologist. Plain radiographic images are visualized andpreliminarily interpreted by the  ED Provider with the below findings:        Interpretation perthe Radiologist below, if available at the time of this note:    CT CHEST PULMONARY EMBOLISM W CONTRAST   Final Result      1. No evidence of acute pulmonary emboli. 2.  4 mm or smaller subpleural nodules within the posterior aspect of both   lower lobes and unchanged. Additional nodule involving the left major   fissure is consistent with a perifissural nodule and therefore considered   benign. 3.  Right middle lobe atelectasis versus scarring, unchanged. No acute   pulmonary disease noted otherwise. CT HEAD WO CONTRAST   Final Result      No acute intracranial abnormality noted. No results found.       PROCEDURES   Unless otherwise noted below, none     Procedures    CRITICAL CARE TIME   N/A    CONSULTS:  None      EMERGENCY DEPARTMENT COURSE and DIFFERENTIAL DIAGNOSIS/MDM:   Vitals:    Vitals:    07/18/22 1831 07/18/22 1850 07/18/22 2115   BP:  (!) 156/97 (!) 145/81   Pulse: 86 80 75   Resp: 20 21 20   Temp: 98 °F (36.7 °C)     SpO2: 100% 98% 100%   Weight: (!) 351 lb 13.7 oz (159.6 kg)     Height: 5' 9\" (1.753 m)         Patient was given thefollowing medications:  Medications   iopamidol (ISOVUE-370) 76 % injection 75 mL (75 mLs IntraVENous Given 7/18/22 1955)         Is this patient to be included in the SEP-1 Core Measure due to severe sepsis or septic shock? No   Exclusion criteria - the patient is NOT to be included for SEP-1 Core Measure due to: Infection is not suspected    This is a 26-year-old female with PMH of HTN--not currently taking her metoprolol, obesity, PE, syncope and collapse who presents emergency department today for complaint of intermittent chest pain that is been present for the past couple of days. Upon arrival patient had syncopal episode in the lobby after going from a sitting to a standing position quickly. Vitals upon arrival show the patient is mildly hypertensive, otherwise within normal limits. Blood work was performed and shows mild hyponatremia 135, otherwise within normal limits. I did get a CT of the head as staff states that patient did fall and hit her head. Additionally a CT of the chest to evaluate for pulmonary embolism was performed. Both of these tests were reviewed and read by radiologist as above showing no acute abnormality. At this time discharge was discussed with patient and she is agreeable to this plan. We discussed following up with her cardiologist for further evaluation and having her echocardiogram performed. Patient was agreeable to this. She was given some Toradol to go home with. Additionally we discussed managing her anxiety, stress as she does have upcoming exams. I did see this patient with my attending, Dr. Goran Montes see his MDM and disposition for further information. FINAL IMPRESSION      1. Chest pain, unspecified type    2.  Vasovagal syncope          DISPOSITION/PLAN   DISPOSITION Decision To Discharge 07/18/2022 09:22:17 PM      PATIENT REFERREDTO:  HARSH Rosales 82 [address] [facility]  [phone]    Schedule an appointment as soon as possible for a visit in 2 days  for reevaluation    [facility]  [address] [address]  [phone]  Go to   As needed, If symptoms worsen    DISCHARGE MEDICATIONS:  Discharge Medication List as of 7/18/2022  9:40 PM        START taking these medications    Details   ketorolac (TORADOL) 10 MG tablet Take 1 tablet by mouth every 6 hours as needed for Pain, Disp-20 tablet, R-0Normal             DISCONTINUED MEDICATIONS:  Discharge Medication List as of 7/18/2022  9:40 PM                 (Please note that portions ofthis note were completed with a voice recognition program.  Efforts were made to edit the dictations but occasionally words are mis-transcribed.)    Weston Benítez PA-C (electronically signed)             Weston Benítez PA-C  07/19/22 0408

## 2022-07-18 NOTE — ED NOTES
Pt arrived to the ed private car, pt ws in the lobby and started running and then stated that she couldn't hear anything but saw lips moving and then she tripped and fell pt is now alert and orient, vss afebrile      Rain Flood RN  07/18/22 4282

## 2022-07-18 NOTE — ED PROVIDER NOTES
I independently evaluated and obtained a history and physical on Ernie Fay. All diagnostic, treatment, and disposition assistants were made to myself in conjunction the advanced practice provider. For further details of this patient's emergency department encounter, please see the advanced practice provider's documentation. History: This patient presents emergency department complaint of chest discomfort. She was admitted to the hospital at the end of May for pressure-like chest discomfort. Cardiac stress testing at that time that was low probability study. She was noted to have syncope after the stress test was completed. She comes in today for several seconds of sharp chest discomfort with occasional radiation into her left shoulder. She states that happened just prior to arrival.  She states now she has no sharp pain but just a little bit of tightness. No nausea or vomiting. No fever or chills. Physician Exam: Female no acute distress. Her blood pressure mildly elevated 156/97. She is not tachycardic. Her abdomen reveals a BMI of 51. Nontender. The Ekg interpreted by me shows  normal sinus rhythm with a rate of 85  Axis is   Normal  QTc is  normal  Intervals and Durations are unremarkable. ST Segments: normal  No significant change from prior EKG dated 5/27/2022        I personally saw the patient and performed a substantive portion of the visit including all aspects of the medical decision making. MDM:     DDX: ACS, significant anemia, electrolyte abnormality, other      Is this patient to be included in the SEP-1 Core Measure? No   Exclusion criteria - the patient is NOT to be included for SEP-1 Core Measure due to:   Infection is not suspected       Frederick Be MD  07/18/22 5494

## 2022-07-19 NOTE — ED NOTES
Provider order placed for patient's discharge. Provider reviewed decision to discharge with the patient. Discharge paperwork and any prescriptions were reviewed with the patient. Patient verbalized understanding of discharge education and any prescriptions and has no further questions or further needs at this time. Patient left with all personal belongings and was stable upon departure. Patient thanked for choosing [facility] and informed to return should any need arise.        Fortino Torres RN  07/18/22 9954

## 2022-07-19 NOTE — DISCHARGE INSTRUCTIONS
Incidental CT findings:   4 mm or smaller subpleural nodules within the posterior aspect of both   lower lobes and unchanged. Additional nodule involving the left major   fissure is consistent with a perifissural nodule and therefore considered   benign.

## 2022-07-20 LAB
EKG ATRIAL RATE: 85 BPM
EKG DIAGNOSIS: NORMAL
EKG P AXIS: 43 DEGREES
EKG P-R INTERVAL: 172 MS
EKG Q-T INTERVAL: 360 MS
EKG QRS DURATION: 88 MS
EKG QTC CALCULATION (BAZETT): 428 MS
EKG R AXIS: 52 DEGREES
EKG T AXIS: 43 DEGREES
EKG VENTRICULAR RATE: 85 BPM

## 2022-07-20 PROCEDURE — 93010 ELECTROCARDIOGRAM REPORT: CPT | Performed by: INTERNAL MEDICINE

## 2022-11-19 ENCOUNTER — APPOINTMENT (OUTPATIENT)
Dept: CT IMAGING | Age: 37
End: 2022-11-19
Payer: COMMERCIAL

## 2022-11-19 ENCOUNTER — HOSPITAL ENCOUNTER (EMERGENCY)
Age: 37
Discharge: HOME | End: 2022-11-19
Payer: COMMERCIAL

## 2022-11-19 ENCOUNTER — APPOINTMENT (OUTPATIENT)
Dept: GENERAL RADIOLOGY | Age: 37
End: 2022-11-19
Payer: COMMERCIAL

## 2022-11-19 VITALS
TEMPERATURE: 97.8 F | RESPIRATION RATE: 26 BRPM | OXYGEN SATURATION: 97 % | HEART RATE: 82 BPM | HEIGHT: 70 IN | BODY MASS INDEX: 41.95 KG/M2 | SYSTOLIC BLOOD PRESSURE: 156 MMHG | WEIGHT: 293 LBS | DIASTOLIC BLOOD PRESSURE: 112 MMHG

## 2022-11-19 DIAGNOSIS — R51.9 ACUTE NONINTRACTABLE HEADACHE, UNSPECIFIED HEADACHE TYPE: ICD-10-CM

## 2022-11-19 DIAGNOSIS — R55 VASOVAGAL SYNCOPE: Primary | ICD-10-CM

## 2022-11-19 LAB
ANION GAP SERPL CALCULATED.3IONS-SCNC: 11 MMOL/L (ref 3–16)
BASOPHILS ABSOLUTE: 0 K/UL (ref 0–0.2)
BASOPHILS RELATIVE PERCENT: 0.8 %
BILIRUBIN URINE: NEGATIVE
BLOOD, URINE: NEGATIVE
BUN BLDV-MCNC: 8 MG/DL (ref 7–20)
CALCIUM SERPL-MCNC: 9 MG/DL (ref 8.3–10.6)
CHLORIDE BLD-SCNC: 103 MMOL/L (ref 99–110)
CLARITY: CLEAR
CO2: 23 MMOL/L (ref 21–32)
COLOR: YELLOW
CREAT SERPL-MCNC: 0.6 MG/DL (ref 0.6–1.1)
D DIMER: 0.56 UG/ML FEU (ref 0–0.6)
EOSINOPHILS ABSOLUTE: 0.1 K/UL (ref 0–0.6)
EOSINOPHILS RELATIVE PERCENT: 1.8 %
GFR SERPL CREATININE-BSD FRML MDRD: >60 ML/MIN/{1.73_M2}
GLUCOSE BLD-MCNC: 94 MG/DL (ref 70–99)
GLUCOSE URINE: NEGATIVE MG/DL
HCG(URINE) PREGNANCY TEST: NEGATIVE
HCT VFR BLD CALC: 42 % (ref 36–48)
HEMOGLOBIN: 13.8 G/DL (ref 12–16)
KETONES, URINE: NEGATIVE MG/DL
LEUKOCYTE ESTERASE, URINE: NEGATIVE
LYMPHOCYTES ABSOLUTE: 1.4 K/UL (ref 1–5.1)
LYMPHOCYTES RELATIVE PERCENT: 24.1 %
MCH RBC QN AUTO: 29.4 PG (ref 26–34)
MCHC RBC AUTO-ENTMCNC: 32.8 G/DL (ref 31–36)
MCV RBC AUTO: 89.4 FL (ref 80–100)
MICROSCOPIC EXAMINATION: NORMAL
MONOCYTES ABSOLUTE: 0.5 K/UL (ref 0–1.3)
MONOCYTES RELATIVE PERCENT: 8.5 %
NEUTROPHILS ABSOLUTE: 3.6 K/UL (ref 1.7–7.7)
NEUTROPHILS RELATIVE PERCENT: 64.8 %
NITRITE, URINE: NEGATIVE
PDW BLD-RTO: 13.2 % (ref 12.4–15.4)
PH UA: 6.5 (ref 5–8)
PLATELET # BLD: 349 K/UL (ref 135–450)
PMV BLD AUTO: 8.9 FL (ref 5–10.5)
POTASSIUM REFLEX MAGNESIUM: 4.1 MMOL/L (ref 3.5–5.1)
PROTEIN UA: NEGATIVE MG/DL
RBC # BLD: 4.7 M/UL (ref 4–5.2)
SODIUM BLD-SCNC: 137 MMOL/L (ref 136–145)
SPECIFIC GRAVITY UA: 1.02 (ref 1–1.03)
TROPONIN: <0.01 NG/ML
URINE REFLEX TO CULTURE: NORMAL
URINE TYPE: NORMAL
UROBILINOGEN, URINE: 1 E.U./DL
WBC # BLD: 5.6 K/UL (ref 4–11)

## 2022-11-19 PROCEDURE — 96375 TX/PRO/DX INJ NEW DRUG ADDON: CPT

## 2022-11-19 PROCEDURE — 85025 COMPLETE CBC W/AUTO DIFF WBC: CPT

## 2022-11-19 PROCEDURE — 81003 URINALYSIS AUTO W/O SCOPE: CPT

## 2022-11-19 PROCEDURE — 93005 ELECTROCARDIOGRAM TRACING: CPT | Performed by: PHYSICIAN ASSISTANT

## 2022-11-19 PROCEDURE — 96361 HYDRATE IV INFUSION ADD-ON: CPT

## 2022-11-19 PROCEDURE — 96374 THER/PROPH/DIAG INJ IV PUSH: CPT

## 2022-11-19 PROCEDURE — 80048 BASIC METABOLIC PNL TOTAL CA: CPT

## 2022-11-19 PROCEDURE — 84484 ASSAY OF TROPONIN QUANT: CPT

## 2022-11-19 PROCEDURE — 71045 X-RAY EXAM CHEST 1 VIEW: CPT

## 2022-11-19 PROCEDURE — 84703 CHORIONIC GONADOTROPIN ASSAY: CPT

## 2022-11-19 PROCEDURE — 70450 CT HEAD/BRAIN W/O DYE: CPT

## 2022-11-19 PROCEDURE — 2580000003 HC RX 258: Performed by: PHYSICIAN ASSISTANT

## 2022-11-19 PROCEDURE — 99285 EMERGENCY DEPT VISIT HI MDM: CPT

## 2022-11-19 PROCEDURE — 72125 CT NECK SPINE W/O DYE: CPT

## 2022-11-19 PROCEDURE — 36415 COLL VENOUS BLD VENIPUNCTURE: CPT

## 2022-11-19 PROCEDURE — 85379 FIBRIN DEGRADATION QUANT: CPT

## 2022-11-19 PROCEDURE — 94761 N-INVAS EAR/PLS OXIMETRY MLT: CPT

## 2022-11-19 PROCEDURE — 6360000002 HC RX W HCPCS: Performed by: PHYSICIAN ASSISTANT

## 2022-11-19 RX ORDER — IPRATROPIUM BROMIDE AND ALBUTEROL SULFATE 2.5; .5 MG/3ML; MG/3ML
1 SOLUTION RESPIRATORY (INHALATION) ONCE
Status: DISCONTINUED | OUTPATIENT
Start: 2022-11-19 | End: 2022-11-19 | Stop reason: HOSPADM

## 2022-11-19 RX ORDER — 0.9 % SODIUM CHLORIDE 0.9 %
1000 INTRAVENOUS SOLUTION INTRAVENOUS ONCE
Status: COMPLETED | OUTPATIENT
Start: 2022-11-19 | End: 2022-11-19

## 2022-11-19 RX ORDER — ONDANSETRON 2 MG/ML
4 INJECTION INTRAMUSCULAR; INTRAVENOUS ONCE
Status: COMPLETED | OUTPATIENT
Start: 2022-11-19 | End: 2022-11-19

## 2022-11-19 RX ORDER — KETOROLAC TROMETHAMINE 30 MG/ML
30 INJECTION, SOLUTION INTRAMUSCULAR; INTRAVENOUS ONCE
Status: COMPLETED | OUTPATIENT
Start: 2022-11-19 | End: 2022-11-19

## 2022-11-19 RX ADMIN — KETOROLAC TROMETHAMINE 30 MG: 30 INJECTION, SOLUTION INTRAMUSCULAR at 09:17

## 2022-11-19 RX ADMIN — SODIUM CHLORIDE 1000 ML: 9 INJECTION, SOLUTION INTRAVENOUS at 09:16

## 2022-11-19 RX ADMIN — ONDANSETRON 4 MG: 2 INJECTION INTRAMUSCULAR; INTRAVENOUS at 09:17

## 2022-11-19 ASSESSMENT — PAIN DESCRIPTION - LOCATION
LOCATION: HEAD

## 2022-11-19 ASSESSMENT — PAIN SCALES - GENERAL
PAINLEVEL_OUTOF10: 3
PAINLEVEL_OUTOF10: 6
PAINLEVEL_OUTOF10: 6

## 2022-11-19 ASSESSMENT — PAIN - FUNCTIONAL ASSESSMENT: PAIN_FUNCTIONAL_ASSESSMENT: 0-10

## 2022-11-19 NOTE — DISCHARGE INSTRUCTIONS
Home in good condition to increase hydration, generally rest, and slowly advance activity as tolerated. Call your family doctor to arrange recheck and further care in the next 3 to 5 days including conversation about potential outpatient cardiac monitoring. Avoid alcohol use. Return to the emergency department for any emergency worsening or concern.

## 2022-11-19 NOTE — ED PROVIDER NOTES
The Ekg interpreted by me shows  normal sinus rhythm with a rate of 82  Axis is   Normal  QTc is  normal  Intervals and Durations are unremarkable.       ST Segments: normal  No significant change from prior EKG dated July 18, 2022          Afua Mcintyre MD  11/19/22 7900

## 2022-11-19 NOTE — ED PROVIDER NOTES
[address] [facility]        Pt Name: Raffaele Kim  MRN: [id number]  [place] 1985  Date of evaluation: 11/19/2022  Provider: Dusty Hill PA-C  PCP: Louise Grant MD  Note Started: 8:32 AM EST11/19/2022       KRYSTAL. I have evaluated this patient. My supervising physician was available for consultation. Triage CHIEF COMPLAINT       Chief Complaint   Patient presents with    Loss of Consciousness     Pt states that she woke up this morning around 645. States she became dizzy, headache, SOB, and lost consciousness. States that daughter woke her up and was getting her off the bathroom floor around 715. C/o back of head pain and SOB currently. Unsure if hit head. HISTORY OF PRESENT ILLNESS   (Location/Symptom, Timing/Onset, Context/Setting, Quality, Duration, Modifying Factors, Severity)  Note limiting factors. Chief Complaint: Syncope at home this morning    Raffaele Kim is a 44 y.o. female who presents stating that around 6:45 AM this morning she was in the restroom and went to get up when she started feeling her heart pounding, dizzines, head throbbing and passed out. Patient states that her daughter found her there on the bathroom floor a little while longer. Patient indicates that she does have some headache at this time is mostly in the back of the head, maybe 6 or 7 out of 10 which she would typically take some ibuprofen for and go about her day as it is not too bad. Patient indicates she is really not certain whether or not she could have struck her head when she fell or not. She states that she really is not short of breath though she initially stated that 2 nursing. Patient states that she has asthma and this time a year she very frequently has a little tightness in her chest which is similar to now. She did take her albuterol this morning when she first got up as well.   Patient states that she feels a little stomach upset but no vomiting so far this morning, no sweats or diaphoresis. She states that she does have a history of passing out a number of times before and that her family doctor has told her they feel she has orthostatic hypotension. The last time that she passed out was not within the last couple of months or so. No previous history of cardiac abnormality for patient. She does have a history of previous pregnancy during which she had preeclampsia, she had a  after which she had PEs that they felt were complication of that procedure and pregnancy. No ongoing anticoagulation or established clotting disorder according to patient, or any other previous clotting issues. She denies any leg swelling or calf swelling or pain compared to usual.      Nursing Notes were all reviewed and agreed with or any disagreements were addressed in the HPI. REVIEW OF SYSTEMS    (2-9 systems for level 4, 10 or more for level 5)     Review of Systems  Positive history as above with no fevers or chills cough or congestion or recent illness. No vision change or vision loss, runny or stuffy nose or sore throat or earache. Positive symptoms as above with patient denying shortness of breath to me but stating that she feels a little tight like asthma, no pain in the chest, no abdominal pain, minimal nausea currently no vomiting. No acute urine or stool change or extremity acute loss of range of motion or strength or sensation or rash.   PAST MEDICAL HISTORY     Past Medical History:   Diagnosis Date    Asthma     dx'd at 23 yo, not well-controlled    Hypertension     with this pregnancy    Obesity     Pre-eclampsia     Pulmonary emboli ([address]        SURGICAL HISTORY     Past Surgical History:   Procedure Laterality Date     SECTION      x 3    CHOLECYSTECTOMY, LAPAROSCOPIC  2018     Laparoscopic cholecystectomy with cholangiogram    UPPER GASTROINTESTINAL ENDOSCOPY N/A 2019    EGD DIAGNOSTIC ONLY performed by Jannette Pastor MD at [address] [place] [address]       Previous Medications    ALBUTEROL SULFATE HFA (PROVENTIL HFA) 108 (90 BASE) MCG/ACT INHALER    Inhale 2 puffs into the lungs every 4 hours as needed for Wheezing or Shortness of Breath (Space out to every 6 hours as symptoms improve) Space out to every 6 hours as symptoms improve.     BUTALBITAL-ACETAMINOPHEN-CAFFEINE (FIORICET, ESGIC) -40 MG PER TABLET    Take 1 tablet by mouth every 4 hours as needed for Headaches    KETOROLAC (TORADOL) 10 MG TABLET    Take 1 tablet by mouth every 6 hours as needed for Pain    METOPROLOL SUCCINATE (TOPROL XL) 50 MG EXTENDED RELEASE TABLET    Take 1 tablet by mouth daily    PANTOPRAZOLE (PROTONIX) 40 MG TABLET    Take 40 mg by mouth daily       ALLERGIES     Shellfish-derived products    FAMILYHISTORY       Family History   Problem Relation Age of Onset    High Blood Pressure Father     Diabetes Paternal Grandmother     High Blood Pressure Paternal Grandmother     Diabetes Paternal Grandfather     High Blood Pressure Paternal Grandfather     Asthma Mother     Cancer Maternal Grandmother         SOCIAL HISTORY       Social History     Socioeconomic History    Marital status:      Spouse name: None    Number of children: None    Years of education: None    Highest education level: None   Tobacco Use    Smoking status: Never    Smokeless tobacco: Never   Vaping Use    Vaping Use: Never used   Substance and Sexual Activity    Alcohol use: No     Comment: rarely    Drug use: No    Sexual activity: Yes     Partners: Male       SCREENINGS    [place] Coma Scale  Eye Opening: Spontaneous  Best Verbal Response: Oriented  Best Motor Response: Obeys commands  [place] Coma Scale Score: 15        PHYSICAL EXAM    (up to 7 for level 4, 8 or more for level 5)     ED Triage Vitals [11/19/22 0825]   BP Temp Temp Source Heart Rate Resp SpO2 Height Weight   (!) 142/99 98.1 °F (36.7 °C) Oral 87 14 100 % 5' 10\" (1.778 m) (!) 333 lb 1.8 oz (151.1 kg)       Physical Exam  Vitals and nursing note reviewed. Constitutional:       Appearance: Normal appearance. She is not ill-appearing or diaphoretic. HENT:      Head: Normocephalic. Comments: Tenderness across the lower posterior scalp and into the C-spine on exam without acute deformity or obvious abrasion erythema swelling/hematoma or change in heat     Right Ear: External ear normal.      Left Ear: External ear normal.      Nose: Nose normal.      Mouth/Throat:      Mouth: Mucous membranes are moist.      Comments: Gag reflex intact  Eyes:      General:         Right eye: No discharge. Left eye: No discharge. Conjunctiva/sclera: Conjunctivae normal.   Neck:     Cardiovascular:      Rate and Rhythm: Normal rate and regular rhythm. Pulses: Normal pulses. Heart sounds: Normal heart sounds. No murmur heard. No gallop. Pulmonary:      Effort: Pulmonary effort is normal. No respiratory distress. Breath sounds: Normal breath sounds. No wheezing, rhonchi or rales. Abdominal:      General: Bowel sounds are normal.      Palpations: Abdomen is soft. Tenderness: There is no abdominal tenderness. There is no right CVA tenderness or left CVA tenderness. Musculoskeletal:         General: Normal range of motion. Cervical back: Normal range of motion and neck supple. No rigidity. Normal range of motion. Right lower leg: No edema. Left lower leg: No edema. Lymphadenopathy:      Cervical: No cervical adenopathy. Skin:     General: Skin is warm and dry. Capillary Refill: Capillary refill takes less than 2 seconds. Neurological:      Mental Status: She is alert and oriented to person, place, and time. Mental status is at baseline.    Psychiatric:         Mood and Affect: Mood normal.         Behavior: Behavior normal.       DIAGNOSTIC RESULTS   LABS:    Labs Reviewed   URINALYSIS WITH REFLEX TO CULTURE   PREGNANCY, URINE   D-DIMER, QUANTITATIVE   CBC WITH AUTO DIFFERENTIAL   BASIC METABOLIC PANEL W/ REFLEX TO MG FOR LOW K   TROPONIN       When ordered, only abnormal lab results are displayed. All other labs were within normal range or not returned as of this dictation. EKG: When ordered, EKG's are interpreted by the Emergency Department Physician in the absence of a cardiologist.  Please see their note for interpretation of EKG. RADIOLOGY:   Non-plain film images such as CT, Ultrasound and MRI are read by the radiologist. Plain radiographic images are visualized andpreliminarily interpreted by the  ED Provider with the below findings:        Interpretation perthe Radiologist below, if available at the time of this note:    CT HEAD WO CONTRAST   Final Result   No acute intracranial abnormality. CT CERVICAL SPINE WO CONTRAST   Final Result   No acute abnormality of the cervical spine. XR CHEST PORTABLE   Final Result   No active pulmonary or pleural disease. No results found.       PROCEDURES   Unless otherwise noted below, none     Procedures    CRITICAL CARE TIME   N/A    CONSULTS:  None      EMERGENCY DEPARTMENT COURSE and DIFFERENTIAL DIAGNOSIS/MDM:   Vitals:    Vitals:    11/19/22 0930 11/19/22 0956 11/19/22 1000 11/19/22 1030   BP: (!) 146/117  (!) 157/92 (!) 156/112   Pulse: 85 90 91 82   Resp: 17 14 20 26   Temp:       TempSrc:       SpO2: 100% 98% 94% 97%   Weight:       Height:           Patient was given thefollowing medications:  Medications   ipratropium-albuterol (DUONEB) nebulizer solution 1 ampule (1 ampule Inhalation Not Given 11/19/22 0955)   0.9 % sodium chloride bolus (1,000 mLs IntraVENous New Bag 11/19/22 0916)   ondansetron (ZOFRAN) injection 4 mg (4 mg IntraVENous Given 11/19/22 0917)   ketorolac (TORADOL) injection 30 mg (30 mg IntraVENous Given 11/19/22 5103)         Is this patient to be included in the SEP-1 Core Measure due to severe sepsis or septic shock? No   Exclusion criteria - the patient is NOT to be included for SEP-1 Core Measure due to: Infection is not suspected  This patient presents as above and evaluation and treatment is begun here including some IV fluids and labs, CT head and CT C-spine, medication for nausea and for pain. EKG obtained and interpreted by ED physician as normal sinus rhythm. Please see their note for details. Chest x-ray negative as above. No CT head and CT C-spine obtained, also showing no acute disease per radiology. Labs show a CBC that is totally normal, BMP without abnormality, no elevated troponin, no elevated D-dimer. The reported dizziness that patient had first thing this morning seems to be more of a presyncopal issue and not a CNS origin. With normal oxygenation, no tachycardia or tachypnea and negative orthostatics obtained, no suspicion of PE present at this time. Also no indication or suspicion of acute ischemic coronary syndrome currently. CT head and CT C-spine as well as chest x-ray negative as read by radiology above. Negative pregnancy and negative for UTI based on urine. Throughout patient is quite stable, vital signs look good, no indication of acute system compromise requiring further inpatient evaluation and treatment. After medications patient states that her headache is totally resolved. Conservative home care now discussed and recommended to patient who verbalizes understanding and agreement with the above and the following discharge home plan. Home in good condition to increase hydration, generally rest, and slowly advance activity as tolerated. Call your family doctor to arrange recheck and further care in the next 3 to 5 days including conversation about potential outpatient cardiac monitoring. Avoid alcohol use. Return to the emergency department for any emergency worsening or concern. I am the Primary Clinician of Record. FINAL IMPRESSION      1.  Vasovagal syncope    2.  Acute nonintractable headache, unspecified headache type          DISPOSITION/PLAN   DISPOSITION Decision To Discharge 11/19/2022 10:29:16 AM      PATIENT REFERREDTO:  Evalee Fleischer, MD [address]  [id number] [facility] [phone]    Schedule an appointment as soon as possible for a visit       DISCHARGE MEDICATIONS:  New Prescriptions    No medications on file       DISCONTINUED MEDICATIONS:  Discontinued Medications    No medications on file              (Please note that portions ofthis note were completed with a voice recognition program.  Efforts were made to edit the dictations but occasionally words are mis-transcribed.)    Hernesto Vázquez PA-C (electronically signed)              Hernesto Vázquez PA-C  11/19/22 7949

## 2022-11-19 NOTE — Clinical Note
Summer Starr was seen and treated in our emergency department on 11/19/2022. She may return to work on 11/20/2022. If you have any questions or concerns, please don't hesitate to call.       Ethan Oreilly PA-C

## 2022-11-19 NOTE — ED TRIAGE NOTES
Pt states that she woke up this morning around 645. States she became dizzy, headache, SOB, and lost consciousness. States that daughter woke her up and was getting her off the bathroom floor around 715. C/o back of head pain and SOB currently. Unsure if hit head.

## 2022-11-20 LAB
EKG ATRIAL RATE: 82 BPM
EKG DIAGNOSIS: NORMAL
EKG P AXIS: 46 DEGREES
EKG P-R INTERVAL: 188 MS
EKG Q-T INTERVAL: 360 MS
EKG QRS DURATION: 86 MS
EKG QTC CALCULATION (BAZETT): 420 MS
EKG R AXIS: 40 DEGREES
EKG T AXIS: 39 DEGREES
EKG VENTRICULAR RATE: 82 BPM

## 2022-11-20 PROCEDURE — 93010 ELECTROCARDIOGRAM REPORT: CPT | Performed by: INTERNAL MEDICINE

## (undated) DEVICE — ENDOSCOPY KIT: Brand: MEDLINE INDUSTRIES, INC.

## (undated) DEVICE — BITE BLK 60FR GRN ENDOSCP AD W STRP SLD DISPOSABLE